# Patient Record
(demographics unavailable — no encounter records)

---

## 2024-10-28 NOTE — PHYSICAL EXAM
[General Appearance - Alert] : alert [General Appearance - In No Acute Distress] : in no acute distress [Sclera] : the sclera and conjunctiva were normal [PERRL With Normal Accommodation] : pupils were equal in size, round, and reactive to light [Extraocular Movements] : extraocular movements were intact [Outer Ear] : the ears and nose were normal in appearance [Oropharynx] : the oropharynx was normal [Neck Appearance] : the appearance of the neck was normal [Neck Cervical Mass (___cm)] : no neck mass was observed [Jugular Venous Distention Increased] : there was no jugular-venous distention [Thyroid Diffuse Enlargement] : the thyroid was not enlarged [Thyroid Nodule] : there were no palpable thyroid nodules [] : no respiratory distress [Auscultation Breath Sounds / Voice Sounds] : lungs were clear to auscultation bilaterally [Heart Rate And Rhythm] : heart rate was normal and rhythm regular [Heart Sounds] : normal S1 and S2 [Heart Sounds Gallop] : no gallops [Murmurs] : no murmurs [Heart Sounds Pericardial Friction Rub] : no pericardial rub [Full Pulse] : the pedal pulses are present [Edema] : there was no peripheral edema [Cervical Lymph Nodes Enlarged Posterior Bilaterally] : posterior cervical [Cervical Lymph Nodes Enlarged Anterior Bilaterally] : anterior cervical [Supraclavicular Lymph Nodes Enlarged Bilaterally] : supraclavicular [Axillary Lymph Nodes Enlarged Bilaterally] : axillary [No CVA Tenderness] : no ~M costovertebral angle tenderness [No Spinal Tenderness] : no spinal tenderness [Abnormal Walk] : normal gait [Nail Clubbing] : no clubbing  or cyanosis of the fingernails [Musculoskeletal - Swelling] : no joint swelling seen [Motor Tone] : muscle strength and tone were normal [FreeTextEntry1] : scattered psoriatic lesions on the feet [No Focal Deficits] : no focal deficits [Oriented To Time, Place, And Person] : oriented to person, place, and time [Impaired Insight] : insight and judgment were intact [Affect] : the affect was normal

## 2024-10-28 NOTE — HISTORY OF PRESENT ILLNESS
[Currently Experiencing] : currently [Fatigue] : fatigue [Arthralgias] : arthralgias [Myalgias] : myalgias [FreeTextEntry1] : 68-year-old male here for evaluation of elevated inflammatory markers   He has had elevated ESR, CRP and calprotectin with essentially work up thus far - normal EGD, colonoscopy, gastric emptying time, bone marrow biopsy and PET scan normal. He has also had a 60lbs weight loss over 8 months of unclear etiology with no GI symptoms or change in eating habits. Malignancy work up was negative as well. MRCP of pancreas is pending although labs were normal.   He has h/o psoriasis for 20 - 30 years over the feet and scattered on the extremities treated with topical steroids and also some back pain.  Sometimes has spasm in the fingers. C/o mid and low back pain that comes on a few times a week as does the pain in the shoulders. Denies hip or thigh pain. Pain in the back does not radiate down to the legs, stiffness < 30 mins. Pain in the shoulders is also intermittent and one side at a time, usually not in both simultaneously.  Denies any constitutional symptoms - fevers, chills, malaise, weight loss, myalgias. No h/o uveitis, hair loss, headaches, mucosal ulcers, Raynaud's, sicca symptoms, GI or  issues, serositis, pleuritis, pericarditis, weakness or paresthesia. No h/o blood clots.   [Anorexia] : no anorexia [Weight Loss] : no weight loss [Malaise] : no malaise [Fever] : no fever [Chills] : no chills [Depression] : no depression [Malar Facial Rash] : no malar facial rash [Skin Lesions] : no lesions [Skin Nodules] : no skin nodules [Oral Ulcers] : no oral ulcers [Cough] : no cough [Dry Mouth] : no dry mouth [Dysphonia] : no dysphonia [Dysphagia] : no dysphagia [Shortness of Breath] : no shortness of breath [Chest Pain] : no chest pain [Joint Swelling] : no joint swelling [Joint Warmth] : no joint warmth [Joint Deformity] : no joint deformity [Decreased ROM] : no decreased range of motion [Morning Stiffness] : no morning stiffness [Falls] : no falls [Difficulty Standing] : no difficulty standing [Difficulty Walking] : no difficulty walking [Dyspnea] : no dyspnea [Muscle Weakness] : no muscle weakness [Muscle Spasms] : no muscle spasms [Muscle Cramping] : no muscle cramping [Visual Changes] : no visual changes [Eye Pain] : no eye pain [Eye Redness] : no eye redness [Dry Eyes] : no dry eyes

## 2024-10-28 NOTE — ASSESSMENT
[FreeTextEntry1] : 68 year old male here for evaluation of elevated inflammatory markers.  1. Elevated ESR, CRP and calprotectin with a gradual 60lbs weight loss in a fairly active asymptomatic individual. He has a long standing h/o mild psoriasis and some chronic back pain with MRI cervical and thoracic spine showing DJD w/o evidence of axial SpA. Has some shoulder pain but more consistent with rotator cuff tendinopathy rather than active PMR. No GCA symptoms. Will repeat inflammatory markers and also get MRI of the LS spine. If evidence of radiographic SpA, will discuss biologic therapy. Otherwise, could consider treating for smoldering PMR given that he has had other extensive work up for malignancy and other causes of elevated sedimentation rate, which have been negative to date.   Follow up in 3 months. Review labs and MRI when available.

## 2024-10-28 NOTE — DATA REVIEWED
[FreeTextEntry1] : Cervical spine MRI (02/2024): This exam is compared with prior CT scan cervical spine performed on November 20, 2023. Loss of the normal cervical lordosis is seen. The vertebral body height alignment and marrow signal appears normal. Disc desiccation is seen involving the C3-4 C4-5 C5-6 and C6-7 levels which are secondary to chronic degenerative changes. C2-3: Normal C3-4: Disc bulge and bilateral hypertrophic facet joint changes. Moderate to severe narrowing of the right neural foramen. C4-5: Disc bulge and bilateral hypertrophic facet joint changes. Moderate narrowing of the right neural foramen and moderate narrowing of the left neural foramen C5-C6: Disc bulge and bilateral hypertrophic facet joint changes. Moderate to severe narrowing of both neural foramen C6-7: Mild disc bulge is seen. Moderate to severe narrowing of both neural foramen C7-T1: Normal T1-2: Normal The spinal cord demonstrates normal signal and caliber. Evaluation of the paraspinal soft tissues appear normal. IMPRESSION: Degenerative changes as described above.

## 2024-11-05 NOTE — PHYSICAL EXAM
[Normal] : affect appropriate [de-identified] : legs wwp, no edema [de-identified] : abdomen soft NTND, (+) BS, no splenomegaly appreciated [de-identified] : no anterior/posterior cervical, submental/submandibular, axillary, or pelvic MAURO appreciated

## 2024-11-05 NOTE — ASSESSMENT
[FreeTextEntry1] : Pt with h/o anemia and IgA MGUS. He underwent a full workup for anemia at [facility] (Dr Han) and was found to have iron deficiency and was given 3 doses of IV iron and he was further evaluated by GI for bleeding and had a neg w/u most recently with Serafin Dallas and Charmaine. He also was found to have elevated IgA levels (2x the upper limit of normal) and had a BM Bx and PET-CT.  Weight loss had been central complaint, with weight mostly in 140s over last year. He underwent extensive GI w/u for this recently, has not found a clear cause. Also seen by rheum. W/u as below  CBC results today: WBC 9.26, Hgb 11.7, Plt 311K. MCV 90.1, 1.1% imm gran, nl diff otherwise Hgb decr. from 13.5 6/2022 Fe loss was mostly likely GI despite neg w/u, ? AVMs? no recent evidence of iron deficiency, however recent BM 10/9/24 with decreased iron storage, may help explain his fatigue- will arrange IV iron repletion (does not tolerate PO iron). recheck Fe status today with Fe/TIBC, ferritin, soluble transferrin receptor cont. GI f/u re chronic c/o abd bloating CT abd 3/2023: SB diverticulosis, moderate fecal load TSH WNL 7/15/24. Has R thyroid nodule being followed by PCP.  immunoelectrophoresis cont to show weak IgA M-spike which can't be quantitated cold agglutinins as poss cause of intravascular Fe loss checked and neg total IgA increased vs 3/2023 FLC kappa and lambda and ratio relatively unchanged- repeat immunoelectrophoresis today  unexplained persistent hypoalbuminemia ? related to vegetarian diet, chronic inflammation note markedly incr ESR, incr CRP, +MARY, and fecal calprotectin urine checked 12/2023 >>> no proteinuria, nl SPEP check urine for poss source of protein loss >>> U/A (unremarkable Dec. '23), total protein (mildly elev.), urine immunoprotein studies (no monoclonal band identified, normal electrophoresis pattern) Saw rheumatology on 10/28/24- being w/u for spondyloarthropathies, PMR- continue f/u  Planning 6-7 week trip to [facility] at end of month RV after trip to [facility], sooner as needed  Pt seen and discussed with Dr. Dawson. Shilo Morales MD Hematology/Oncology Fellow, PGY-5 11/04/2024

## 2024-11-05 NOTE — ASSESSMENT
[FreeTextEntry1] : Pt with h/o anemia and IgA MGUS. He underwent a full workup for anemia at [facility] (Dr Han) and was found to have iron deficiency and was given 3 doses of IV iron and he was further evaluated by GI for bleeding and had a neg w/u most recently with Serafin Dallas and Charmaine. He also was found to have elevated IgA levels (2x the upper limit of normal) and had a BM Bx and PET-CT.  Weight loss had been central complaint, with weight mostly in 140s over last year. He underwent extensive GI w/u for this recently, has not found a clear cause. Also seen by rheum. W/u as below  CBC results today: WBC 9.26, Hgb 11.7, Plt 311K. MCV 90.1, 1.1% imm gran, nl diff otherwise Hgb decr. from 13.5 6/2022 Fe loss was mostly likely GI despite neg w/u, ? AVMs? no recent evidence of iron deficiency, however recent BM 10/9/24 with decreased iron storage, may help explain his fatigue- will arrange IV iron repletion (does not tolerate PO iron). recheck Fe status today with Fe/TIBC, ferritin, soluble transferrin receptor cont. GI f/u re chronic c/o abd bloating CT abd 3/2023: SB diverticulosis, moderate fecal load TSH WNL 7/15/24. Has R thyroid nodule being followed by PCP.  immunoelectrophoresis cont to show weak IgA M-spike which can't be quantitated cold agglutinins as poss cause of intravascular Fe loss checked and neg total IgA increased vs 3/2023 FLC kappa and lambda and ratio relatively unchanged- repeat immunoelectrophoresis today  unexplained persistent hypoalbuminemia ? related to vegetarian diet, chronic inflammation note markedly incr ESR, incr CRP, +MARY, and fecal calprotectin urine checked 12/2023 >>> no proteinuria, nl SPEP check urine for poss source of protein loss >>> U/A (unremarkable Dec. '23), total protein (mildly elev.), urine immunoprotein studies (no monoclonal band identified, normal electrophoresis pattern) Saw rheumatology on 10/28/24- being w/u for spondyloarthropathies, PMR- continue f/u  Planning 6-7 week trip to [facility] at end of month RV after trip to [facility], sooner as needed  Pt seen and discussed with Dr. Dawson. Shilo Morales MD Hematology/Oncology Fellow, PGY-5 11/04/2024

## 2024-11-05 NOTE — PHYSICAL EXAM
[Normal] : affect appropriate [de-identified] : legs wwp, no edema [de-identified] : abdomen soft NTND, (+) BS, no splenomegaly appreciated [de-identified] : no anterior/posterior cervical, submental/submandibular, axillary, or pelvic MAURO appreciated

## 2024-11-05 NOTE — HISTORY OF PRESENT ILLNESS
[Disease:__________________________] : Disease: [unfilled] [de-identified] : Mr Green is a 63 year old male presenting for workup of chronic anemia (>15 yrs) and recently discovered elevated IgA with IgA kappa band. He has a PMHx of cutaneous psoriasis (uses lotion),  HTN, CAD (s/p 2 stents, now on ASA alone) and iron deficiency anemia. In terms of the iron deficiency anemia he has been on oral iron and recently received IV iron (3x in February) through [facility]. In terms of potential sources of iron deficiency anemia, he underwent EGD and colonoscopy in 2016, again in 2019 with capsule endoscopy added on, and again in a colonoscopy and endoscopy in Feb 2020 (capsule endoscopy is pending) which all showed chronic mild gastritis and mild diverticulosis without any source of bleeding. He has never seen blood in his stool, or dark tarry stools (has darker stools with iron tablets). He also denies hematuria or urinary symptoms. His primary complaint is that he has been very weak to the point he does not like to leave the house. He denies fevers, chills, night sweats, but does have weight loss. He has lost nearly 40 lbs since September 2019 (previously was 190 lbs). He denies any appetite changes or dietary changes. He last traveled out of the country in October 2019 to [place] (for 2.5 months) and when there he felt like he was much more energetic. He has not received any DMARDs for his psoriasis.  These issues had prompted additional workup in January 2020 (Dr Jung Devine) then had to switch to Dr Ki Han in February due to insurance coverage. He had a CT scan of his chest abdomen and pelvis ordered by his GI doc (Dr Linnea Keenan) which showed duodenal and jejunal diverticulosis and several prominent right lower quadrant mesenteric lymph nodes which were also previously seen in 2015. He was found to have iron deficiency anemia (given IV iron) and also found to have an IgA kappa band / IgA MGUS and RLQ lymphadenopathy which prompted a PET-CT and bone marrow biopsy.    On 3/5/20 he had a PET-CT: impression showed subcentimeter right lower quadrant lymph nodes that are not discretely FDG- avid and too small to characterize, however several appear slightly smaller or less prominent compared to outside CT performed 2/7/20.  He also had a left hip Bone Marrow Biopsy on 3/16/20: - Variable and hypercellular for age of marrow shows maturing trilineage hematopoiesis and no histological or immunophenotypic (via IHC) evidence of clonal cell neoplasm - No evidence of acute myeloid leukemia, metastatic neoplasm or lymphoma.   Lastly, he had an MRI of the brain 2/7/20 which showed mild midline prominence of the pituitary which was performed according to Mr Green due to fatigue and weight loss. [de-identified] : Continues to have mult GI c/o; dahlia constipation and bloating has nausea once every two wks or so CRP 10, ESR 82 4 wks ago w/o definite rheumatic c/o no fevers Has been given parenteral iron in past. Source of Fe def not clear. Extensive GI w/u neg, including panendoscopy incl capsule 12/2022.  Weak IgA M-spike being followed, too small to quantitate. no evidence of plasma cell dyscrasia in 3/2020 marrow which showed moderate increase in Fe stores. Normal cytogenetics. Neg myeloma FISH panel. no new skeletal c/o.  11/4/24: not feeling well. Had CT C/A/P, endoscopy/colonoscopy/VCE, gastric emptying scan all negative done for w/u for weight loss. Returned from [facility] in April at which time he was ~158 lbs, since then he's been losing a lot of weight. BM bx was recently repeated since 2020 in early October '24 for further evaluation, also unremarkable. Also saw ID doctor, parasite test was negative. Also recently returned on 10/5 from [facility] for 15 days. Had a R inguinal hernia surgery on 10/25/24 at [facility]. Also having a lot of back pain, had a thoracic MRI and now being scheduled for lumbar MRI. Also being scheduled for MRCP tomorrow, seeing GI Dr. Oliver and Dr. Dallas. (+) productive cough with yellow mucus, taking Mucinex x1 weekly. Has constipation, taking linzess 72mcg and colace BID. Has ongoing chronic back pain. Recently stopped lisinopril bc BP improved. Not taking escitalopram. Reports he had a normal thyroid biopsy last year.

## 2024-11-05 NOTE — ASSESSMENT
[FreeTextEntry1] : Pt with h/o anemia and IgA MGUS. He underwent a full workup for anemia at [facility] (Dr Han) and was found to have iron deficiency and was given 3 doses of IV iron and he was further evaluated by GI for bleeding and had a neg w/u most recently with Serafin Dallas and Charmaine. He also was found to have elevated IgA levels (2x the upper limit of normal) and had a BM Bx and PET-CT.  Weight loss had been central complaint, with weight mostly in 140s over last year. He underwent extensive GI w/u for this recently, has not found a clear cause. Also seen by rheum. W/u as below  CBC results today: WBC 9.26, Hgb 11.7, Plt 311K. MCV 90.1, 1.1% imm gran, nl diff otherwise Hgb decr. from 13.5 6/2022 Fe loss was mostly likely GI despite neg w/u, ? AVMs? no recent evidence of iron deficiency, however recent BM 10/9/24 with decreased iron storage, may help explain his fatigue- will arrange IV iron repletion (does not tolerate PO iron). recheck Fe status today with Fe/TIBC, ferritin, soluble transferrin receptor cont. GI f/u re chronic c/o abd bloating CT abd 3/2023: SB diverticulosis, moderate fecal load TSH WNL 7/15/24. Has R thyroid nodule being followed by PCP.  immunoelectrophoresis cont to show weak IgA M-spike which can't be quantitated cold agglutinins as poss cause of intravascular Fe loss checked and neg total IgA increased vs 3/2023 FLC kappa and lambda and ratio relatively unchanged- repeat immunoelectrophoresis today  unexplained persistent hypoalbuminemia ? related to vegetarian diet, chronic inflammation note markedly incr ESR, incr CRP, +MARY, and fecal calprotectin urine checked 12/2023 >>> no proteinuria, nl SPEP check urine for poss source of protein loss >>> U/A (unremarkable Dec. '23), total protein (mildly elev.), urine immunoprotein studies (no monoclonal band identified, normal electrophoresis pattern) Saw rheumatology on 10/28/24- being w/u for spondyloarthropathies, PMR- continue f/u  Planning 6-7 week trip to [facility] at end of month RV after trip to [facility], sooner as needed  Pt seen and discussed with Dr. Dwason. Shilo Morales MD Hematology/Oncology Fellow, PGY-5 11/04/2024

## 2024-11-05 NOTE — HISTORY OF PRESENT ILLNESS
[Disease:__________________________] : Disease: [unfilled] [de-identified] : Mr Green is a 63 year old male presenting for workup of chronic anemia (>15 yrs) and recently discovered elevated IgA with IgA kappa band. He has a PMHx of cutaneous psoriasis (uses lotion),  HTN, CAD (s/p 2 stents, now on ASA alone) and iron deficiency anemia. In terms of the iron deficiency anemia he has been on oral iron and recently received IV iron (3x in February) through [facility]. In terms of potential sources of iron deficiency anemia, he underwent EGD and colonoscopy in 2016, again in 2019 with capsule endoscopy added on, and again in a colonoscopy and endoscopy in Feb 2020 (capsule endoscopy is pending) which all showed chronic mild gastritis and mild diverticulosis without any source of bleeding. He has never seen blood in his stool, or dark tarry stools (has darker stools with iron tablets). He also denies hematuria or urinary symptoms. His primary complaint is that he has been very weak to the point he does not like to leave the house. He denies fevers, chills, night sweats, but does have weight loss. He has lost nearly 40 lbs since September 2019 (previously was 190 lbs). He denies any appetite changes or dietary changes. He last traveled out of the country in October 2019 to [place] (for 2.5 months) and when there he felt like he was much more energetic. He has not received any DMARDs for his psoriasis.  These issues had prompted additional workup in January 2020 (Dr Jung Devine) then had to switch to Dr Ki Han in February due to insurance coverage. He had a CT scan of his chest abdomen and pelvis ordered by his GI doc (Dr Linnea Keenan) which showed duodenal and jejunal diverticulosis and several prominent right lower quadrant mesenteric lymph nodes which were also previously seen in 2015. He was found to have iron deficiency anemia (given IV iron) and also found to have an IgA kappa band / IgA MGUS and RLQ lymphadenopathy which prompted a PET-CT and bone marrow biopsy.    On 3/5/20 he had a PET-CT: impression showed subcentimeter right lower quadrant lymph nodes that are not discretely FDG- avid and too small to characterize, however several appear slightly smaller or less prominent compared to outside CT performed 2/7/20.  He also had a left hip Bone Marrow Biopsy on 3/16/20: - Variable and hypercellular for age of marrow shows maturing trilineage hematopoiesis and no histological or immunophenotypic (via IHC) evidence of clonal cell neoplasm - No evidence of acute myeloid leukemia, metastatic neoplasm or lymphoma.   Lastly, he had an MRI of the brain 2/7/20 which showed mild midline prominence of the pituitary which was performed according to Mr Green due to fatigue and weight loss. [de-identified] : Continues to have mult GI c/o; dahlia constipation and bloating has nausea once every two wks or so CRP 10, ESR 82 4 wks ago w/o definite rheumatic c/o no fevers Has been given parenteral iron in past. Source of Fe def not clear. Extensive GI w/u neg, including panendoscopy incl capsule 12/2022.  Weak IgA M-spike being followed, too small to quantitate. no evidence of plasma cell dyscrasia in 3/2020 marrow which showed moderate increase in Fe stores. Normal cytogenetics. Neg myeloma FISH panel. no new skeletal c/o.  11/4/24: not feeling well. Had CT C/A/P, endoscopy/colonoscopy/VCE, gastric emptying scan all negative done for w/u for weight loss. Returned from [facility] in April at which time he was ~158 lbs, since then he's been losing a lot of weight. BM bx was recently repeated since 2020 in early October '24 for further evaluation, also unremarkable. Also saw ID doctor, parasite test was negative. Also recently returned on 10/5 from [facility] for 15 days. Had a R inguinal hernia surgery on 10/25/24 at [facility]. Also having a lot of back pain, had a thoracic MRI and now being scheduled for lumbar MRI. Also being scheduled for MRCP tomorrow, seeing GI Dr. Oliver and Dr. Dallas. (+) productive cough with yellow mucus, taking Mucinex x1 weekly. Has constipation, taking linzess 72mcg and colace BID. Has ongoing chronic back pain. Recently stopped lisinopril bc BP improved. Not taking escitalopram. Reports he had a normal thyroid biopsy last year.

## 2024-11-05 NOTE — REVIEW OF SYSTEMS
[Chills] : chills [Recent Change In Weight] : ~T recent weight change [Fatigue] : fatigue [Cough] : cough [Constipation] : constipation [Diarrhea: Grade 0] : Diarrhea: Grade 0 [Joint Pain] : joint pain [Fever] : no fever [Dysphagia] : no dysphagia [Odynophagia] : no odynophagia [Chest Pain] : no chest pain [Palpitations] : no palpitations [Shortness Of Breath] : no shortness of breath [Wheezing] : no wheezing [Abdominal Pain] : no abdominal pain [Dysuria] : no dysuria [Skin Rash] : no skin rash [Dizziness] : no dizziness [Easy Bleeding] : no tendency for easy bleeding [Easy Bruising] : no tendency for easy bruising [FreeTextEntry2] : occasional night sweats recently once every 2 weeks, varies; chills chronic x2 yrs; (+) fatigue since April [de-identified] : no headaches

## 2024-11-05 NOTE — REVIEW OF SYSTEMS
[Chills] : chills [Fatigue] : fatigue [Recent Change In Weight] : ~T recent weight change [Cough] : cough [Constipation] : constipation [Diarrhea: Grade 0] : Diarrhea: Grade 0 [Joint Pain] : joint pain [Fever] : no fever [Dysphagia] : no dysphagia [Odynophagia] : no odynophagia [Chest Pain] : no chest pain [Palpitations] : no palpitations [Shortness Of Breath] : no shortness of breath [Wheezing] : no wheezing [Abdominal Pain] : no abdominal pain [Dysuria] : no dysuria [Skin Rash] : no skin rash [Dizziness] : no dizziness [Easy Bleeding] : no tendency for easy bleeding [Easy Bruising] : no tendency for easy bruising [FreeTextEntry2] : occasional night sweats recently once every 2 weeks, varies; chills chronic x2 yrs; (+) fatigue since April [de-identified] : no headaches

## 2024-11-05 NOTE — PHYSICAL EXAM
[Normal] : affect appropriate [de-identified] : legs wwp, no edema [de-identified] : abdomen soft NTND, (+) BS, no splenomegaly appreciated [de-identified] : no anterior/posterior cervical, submental/submandibular, axillary, or pelvic MAURO appreciated

## 2024-11-05 NOTE — HISTORY OF PRESENT ILLNESS
[Disease:__________________________] : Disease: [unfilled] [de-identified] : Mr Green is a 63 year old male presenting for workup of chronic anemia (>15 yrs) and recently discovered elevated IgA with IgA kappa band. He has a PMHx of cutaneous psoriasis (uses lotion),  HTN, CAD (s/p 2 stents, now on ASA alone) and iron deficiency anemia. In terms of the iron deficiency anemia he has been on oral iron and recently received IV iron (3x in February) through [facility]. In terms of potential sources of iron deficiency anemia, he underwent EGD and colonoscopy in 2016, again in 2019 with capsule endoscopy added on, and again in a colonoscopy and endoscopy in Feb 2020 (capsule endoscopy is pending) which all showed chronic mild gastritis and mild diverticulosis without any source of bleeding. He has never seen blood in his stool, or dark tarry stools (has darker stools with iron tablets). He also denies hematuria or urinary symptoms. His primary complaint is that he has been very weak to the point he does not like to leave the house. He denies fevers, chills, night sweats, but does have weight loss. He has lost nearly 40 lbs since September 2019 (previously was 190 lbs). He denies any appetite changes or dietary changes. He last traveled out of the country in October 2019 to [place] (for 2.5 months) and when there he felt like he was much more energetic. He has not received any DMARDs for his psoriasis.  These issues had prompted additional workup in January 2020 (Dr Jung Devine) then had to switch to Dr Ki Han in February due to insurance coverage. He had a CT scan of his chest abdomen and pelvis ordered by his GI doc (Dr Linnea Keenan) which showed duodenal and jejunal diverticulosis and several prominent right lower quadrant mesenteric lymph nodes which were also previously seen in 2015. He was found to have iron deficiency anemia (given IV iron) and also found to have an IgA kappa band / IgA MGUS and RLQ lymphadenopathy which prompted a PET-CT and bone marrow biopsy.    On 3/5/20 he had a PET-CT: impression showed subcentimeter right lower quadrant lymph nodes that are not discretely FDG- avid and too small to characterize, however several appear slightly smaller or less prominent compared to outside CT performed 2/7/20.  He also had a left hip Bone Marrow Biopsy on 3/16/20: - Variable and hypercellular for age of marrow shows maturing trilineage hematopoiesis and no histological or immunophenotypic (via IHC) evidence of clonal cell neoplasm - No evidence of acute myeloid leukemia, metastatic neoplasm or lymphoma.   Lastly, he had an MRI of the brain 2/7/20 which showed mild midline prominence of the pituitary which was performed according to Mr Green due to fatigue and weight loss. [de-identified] : Continues to have mult GI c/o; dahlia constipation and bloating has nausea once every two wks or so CRP 10, ESR 82 4 wks ago w/o definite rheumatic c/o no fevers Has been given parenteral iron in past. Source of Fe def not clear. Extensive GI w/u neg, including panendoscopy incl capsule 12/2022.  Weak IgA M-spike being followed, too small to quantitate. no evidence of plasma cell dyscrasia in 3/2020 marrow which showed moderate increase in Fe stores. Normal cytogenetics. Neg myeloma FISH panel. no new skeletal c/o.  11/4/24: not feeling well. Had CT C/A/P, endoscopy/colonoscopy/VCE, gastric emptying scan all negative done for w/u for weight loss. Returned from [facility] in April at which time he was ~158 lbs, since then he's been losing a lot of weight. BM bx was recently repeated since 2020 in early October '24 for further evaluation, also unremarkable. Also saw ID doctor, parasite test was negative. Also recently returned on 10/5 from [facility] for 15 days. Had a R inguinal hernia surgery on 10/25/24 at [facility]. Also having a lot of back pain, had a thoracic MRI and now being scheduled for lumbar MRI. Also being scheduled for MRCP tomorrow, seeing GI Dr. Oliver and Dr. Dallas. (+) productive cough with yellow mucus, taking Mucinex x1 weekly. Has constipation, taking linzess 72mcg and colace BID. Has ongoing chronic back pain. Recently stopped lisinopril bc BP improved. Not taking escitalopram. Reports he had a normal thyroid biopsy last year.

## 2024-11-05 NOTE — END OF VISIT
[] : Fellow [FreeTextEntry3] : Elevated inflammatory markers and GI and diet related c/o raise possibility of IBD though no supportive evidence to date. Has had very extensive and complete GI w/u.  Reasonable to give parenteral iron repletion in light of intolerance for po Fe and decr Fe stores in recent marrow. Blood Fe studies potentially misleading in setting of ongoing inflamnmation. Also suggested continued rheum f/u.

## 2024-11-05 NOTE — REVIEW OF SYSTEMS
[Chills] : chills [Recent Change In Weight] : ~T recent weight change [Fatigue] : fatigue [Cough] : cough [Constipation] : constipation [Diarrhea: Grade 0] : Diarrhea: Grade 0 [Joint Pain] : joint pain [Fever] : no fever [Dysphagia] : no dysphagia [Odynophagia] : no odynophagia [Chest Pain] : no chest pain [Palpitations] : no palpitations [Shortness Of Breath] : no shortness of breath [Wheezing] : no wheezing [Abdominal Pain] : no abdominal pain [Dysuria] : no dysuria [Skin Rash] : no skin rash [Dizziness] : no dizziness [Easy Bleeding] : no tendency for easy bleeding [Easy Bruising] : no tendency for easy bruising [FreeTextEntry2] : occasional night sweats recently once every 2 weeks, varies; chills chronic x2 yrs; (+) fatigue since April [de-identified] : no headaches

## 2024-11-05 NOTE — RESULTS/DATA
[FreeTextEntry1] : BM biopsy/aspirate (10/9/24): Positive for IgH gene rearrangement... flow cytometry with no diagnostic immunophenotypic abnormalities detected...cellular BM with maturing trilineage hematopoiesis and polytypic plasmacytosis...No morphologic/immunophenotypic evidence of involvement by lymphoma, acute leukemia, high-grade myelodysplasia, myeloproliferative neoplasm, or clonal plasma cells...Iron (aspirate): Storage iron is decreased. Ring sideroblasts are not identified...  [facility] molecular studies (Bone Marrow, 10/9/24): Normal male karyotype...negative for common myeloid  DNA mutations and RNA gene fusions

## 2024-11-06 NOTE — DATA REVIEWED
[FreeTextEntry1] : [facility] Accession Number : [id number] Patient:     NAREN SIBLEY Accession:                             [id number] Collected Date/Time:                   10/24/2024 11:40 EDT Received Date/Time:                    10/24/2024 13:18 EDT Surgical Pathology Report - Auth (Verified) Specimen(s) Submitted 1  Lipoma of cord right inguinal Final Diagnosis Lipoma of cord right inguinal: -   Mature adipose tissue consistent lipoma. Verified by: Ugo Teran MD (Electronic Signature) Reported on: 10/25/24 16:07 EDT, [facility], [address] [place] [address]

## 2024-11-06 NOTE — PHYSICAL EXAM
[Respiratory Effort] : normal respiratory effort [Normal Rate and Rhythm] : normal rate and rhythm [No HSM] : no hepatosplenomegaly [Tender] : was nontender [Enlarged] : not enlarged [Alert] : alert [Oriented to Person] : oriented to person [Oriented to Place] : oriented to place [Oriented to Time] : oriented to time [Calm] : calm [de-identified] : Appears well, no acute distress, ambulates easily into the office.  [de-identified] : Remains normocephalic and atraumatic.  [de-identified] : Still supple with full range of motion.  [FreeTextEntry1] : Deferred.  [de-identified] : Deferred.  [de-identified] : Epigastrium remains WNL. Small incidental fat containing umbilical hernia only noted during Valsalva- stable. Left groin intact with well healed operative incision. Right groin with well healing operative site: -wound clean and dry and intact -no odor or drainage -no overlying or surrounding acute inflammatory changes -moderate post operative edema and mild ecchymoses, both well WNL -no SQ collections -no fascial defect or laxity on Valsalva in upright or supine positions Adjacent femoral and Spigelian spaces intact on further dedicated Valsalva challenge. [de-identified] : Penis free of lesion.  Cords free of edema or hematoma.  Testes mildly atrophic, but free of tenderness.   [de-identified] : Deferred.  [de-identified] : Grossly symmetric and within normal limits without motor or sensory deficit.  [de-identified] : Mild psoriasis.

## 2024-11-06 NOTE — HISTORY OF PRESENT ILLNESS
[de-identified] : Exceptionally pleasant gentleman presenting to the office with son and family for hernia consultation. Mr. Green reports a distant prior LEFT inguinal hernia repair some 15 to 20 years ago without subsequent issue. He now reports being aware of a right inguinal hernia for the last 6 or 7 years.  However, it has progressed in size and by symptoms over the last 10 days or so following an upper respiratory infection. He is under the care of GI for chronic constipation but has no associated acute GI or  complaints. Mr. Green is eager to confirm his safety prior to an upcoming trip but would like to ultimately move forward with definitive care through elective repair after his upcoming bone marrow biopsy as part of surveillance for his chronic anemia. [de-identified] : Exceptionally pleasant patient returning to the office in post operative follow-up with his son. Mr. Green is now s/p ambulatory repair of his right inguinal hernia with mesh. He is pleased with his overall progress, though he does report residual incisional surgical site pain. As per his own report, he has slowly resumed his usual casual activities. As per his son's report, Mr. Green has been very hesitant to use even Tylenol and Advil/ Motrin routinely.

## 2024-11-06 NOTE — PLAN
[FreeTextEntry1] : S/P uneventful general anesthesia with uncomplicated open repair of right inguinal hernia with mesh. Mr. SIBLEY is clinically well by his history and surgically stable by this exam. No evidence by history or exam to suggest complication. Mr. SIBLEY is cleared to resume casual activities with own comfort as guide. To refrain from maximal exertions for another month. The timing and technique of scar massage reviewed preliminarily with Mr. SIBLEY. The Pathology report and its benign nature was reviewed in detail. Mr. SIBLEY has been encouraged to call with questions or concerns.  Otherwise, reminded of importance of ongoing bowel regimen with reinforcement of use of Acetaminophen TID and Advil/ Motrin at least BID until comfort improves. RTO to check on overall progress in 2 to 3 weeks prior to his upcoming trip to [facility]. Mr. SIBLEY is pleased and his son agrees. They leave in good spirits able to verbalize instructions as outlined above.

## 2024-11-06 NOTE — REVIEW OF SYSTEMS
[Feeling Poorly] : not feeling poorly [Feeling Tired] : not feeling tired [Recent Weight Loss (___ Lbs)] : recent [unfilled] ~Ulb weight loss [Cough] : cough [Constipation] : constipation [As Noted in HPI] : as noted in HPI [Anxiety] : anxiety [Depression] : no depression [Negative] : Heme/Lymph [FreeTextEntry6] : recent URI. [FreeTextEntry7] : more chronic than acute. [de-identified] : regarding this issue and visit, albeit less so.

## 2024-11-07 NOTE — HISTORY OF PRESENT ILLNESS
[FreeTextEntry1] : RPA: rash  [de-identified] : 68-year-old male with a history of palmoplantar PsO, inverse psoriasis, furunculosis, folliculitis and intertrigo. Itchy rash on the left foot for 15 years. Using vaseline and diflorasone diacetate cream.   Medical history: Fe-def anemia, IgA MGUS, CAD (s/p 2 stents, now on ASA alone), HTN, IBS-C Social history: His son physician at [employer] (hospitalist at [employer]).

## 2024-11-07 NOTE — ASSESSMENT
[FreeTextEntry1] : #Inverse PsO Chronic, improved - We have discussed treatment options, expectations from treatment, and associated side effects of topical therapies. - C/w mometasone cream apply to affected area in the groin once daily x 7 days PRN flares only.   SED including atrophy, dyspigmentation, telangiectasias, striae. Proper use reviewed including only using to affected area and avoidance of prolonged use. - C/w Vaseline as barrier cream. AVOID EtOH wipes.   #Dermatitis, L foot, psoriasis vs. LSC, chronic; flaring Reviewed risks (as well as mitigation strategies for adverse drug events as applicable), benefits, and alternatives of therapy  - continue diflorasone diacetate cream as needed for itch  - r/b/a topical steroids were discussed including but not limited to thinning of the skin, atrophy and dyspigmentation.  - encouraged to break the itch/scratch cycle  RTC PRN.

## 2024-11-07 NOTE — HISTORY OF PRESENT ILLNESS
[FreeTextEntry1] : RPA: rash  [de-identified] : 68-year-old male with a history of palmoplantar PsO, inverse psoriasis, furunculosis, folliculitis and intertrigo. Itchy rash on the left foot for 15 years. Using vaseline and diflorasone diacetate cream.   Medical history: Fe-def anemia, IgA MGUS, CAD (s/p 2 stents, now on ASA alone), HTN, IBS-C Social history: His son physician at [employer] (hospitalist at [place]).

## 2024-11-12 NOTE — ADDENDUM
[FreeTextEntry1] : Documented by Kenny Daniel acting as a scribe for Dr. George Tse on 11/12/2024. All medical record entries made by the Scribe were at my, Dr. George Tse's, direction and personally dictated by me on 11/12/2024. I have reviewed the chart and agree that the record accurately reflects my personal performance of the history, physical exam, assessment and plan. I have also personally directed, reviewed, and agree with the discharge instructions.

## 2024-11-12 NOTE — HISTORY OF PRESENT ILLNESS
[TextBox_4] : Mr. SIBLEY is a 68 year old male originally from [place] with a history of GERD, insomnia, anxiety/depression, low vitamin D, BPH, BPV, CAD s/p stent, IgA and IgG gammopathy, fatty liver, MGUS, bloat presenting to the office today for an initial pulmonary evaluation for SOB, GERD, fatigue, poor sleep, ?PEYTON. His chief complaint is  -he notes trouble getting to sleep -he notes sleeping for 2 hours -he notes waking up due to nocturia -he notes waking up feeling fatigued -he notes constipation is a significant issue -he notes drinking 7, 8 oz glasses of water per day -he notes snoring -he notes PMR Dx and pending Prednisone course  -he notes coughing significantly starting post-surgery which produces yellow mucus and originates in his chest -he notes taking Pantoprazole  -he denies any headaches, nausea, emesis, fever, chills, sweats, chest pain, chest pressure, wheezing, palpitations, diarrhea, constipation, dysphagia, vertigo, arthralgias, myalgias, leg swelling, itchy eyes, itchy ears, heartburn, reflux, or sour taste in the mouth.

## 2024-11-12 NOTE — PROCEDURE
[FreeTextEntry1] : PFTs revealed normal flows; FEV1 was 2.56 L, which is 102% of predicted; normal flow volume loop.  PFTs were performed to evaluate for SOB  FENO was 23; a normal value being less than 25 Fractional exhaled nitric oxide (FENO) is regarded as a simple, noninvasive method for assessing eosinophilic airway inflammation. Produced by a variety of cells within the lung, nitric oxide (NO) concentrations are generally low in healthy individuals. However, high concentrations of NO appear to be involved in nonspecific host defense mechanisms and chronic inflammatory diseases such as asthma. The American Thoracic Society (ATS) therefore has recommended using FENO to aid in the diagnosis and monitoring of eosinophilic airway inflammation and asthma, and for identifying steroid responsive individuals whose chronic respiratory symptoms may be caused by airway inflammation.

## 2024-11-12 NOTE — PHYSICAL EXAM
[No Acute Distress] : no acute distress [III] : Mallampati Class: III [Normal Oropharynx] : normal oropharynx [Normal Appearance] : normal appearance [No Neck Mass] : no neck mass [Normal Rate/Rhythm] : normal rate/rhythm [Normal S1, S2] : normal s1, s2 [No Murmurs] : no murmurs [No Resp Distress] : no resp distress [Clear to Auscultation Bilaterally] : clear to auscultation bilaterally [No Abnormalities] : no abnormalities [Benign] : benign [Normal Gait] : normal gait [No Clubbing] : no clubbing [No Cyanosis] : no cyanosis [No Edema] : no edema [FROM] : FROM [Normal Color/ Pigmentation] : normal color/ pigmentation [No Focal Deficits] : no focal deficits [Oriented x3] : oriented x3 [Normal Affect] : normal affect [TextBox_2] : thin  [TextBox_68] : I:E ratio 1:3; clear

## 2024-11-12 NOTE — ASSESSMENT
[FreeTextEntry1] : Mr. SIBLEY is a 68 year old male originally from [place] with a history of GERD, insomnia, anxiety/depression, low vitamin D, BPH, BPV, CAD s/p stent, IgA and IgG gammopathy, fatty liver, MGUS, bloat presenting to the office today for a follow-up pulmonary evaluation for SOB, GERD, fatigue, poor sleep, ?PEYTON; pending Rx for PMR (Prednisone)   His shortness of breath is multifactorial due to: -poor mechanics of breathing -out of shape -over weight -pulmonary disease   -RADS  -cardiac disease (Letty)   Problem 1: RADS (post-op) 10/2024 -add Breo Ellipta 200 at 1 inhalation QD -Inhaler technique reviewed as well as oral hygiene techniques reviewed with patient. Avoidance of cold air, extremes of temperature, rescue inhaler should be used before exercise. Order of medication reviewed with patient. Recommended adequate hydration and use of a cool mist humidifier in the bedroom  Problem 2: GERD -continue Pantoprazole 40 mg QAM, pre-breakfast -Rule of 2s: avoid eating too much, eating too late, eating too spicy, eating two hours before bed. -Things to avoid including overeating, spicy foods, tight clothing, eating within three hours of bed, this list is not all inclusive. -For treatment of reflux, possible options discussed including diet control, H2 blockers, PPIs, as well as coating motility agents discussed as treatment options. Timing of meals and proximity of last meal to sleep were discussed. If symptoms persist, a formal gastrointestinal evaluation is needed.  problem 2A: bloat / constipation -recommended [facility] digestive enzymes; Dr. Desai   Problem 3: ?PEYTON/poor sleep (elevated Mallampati class, poor quality sleep, snoring) (NC) -complete home sleep study -recommended [employer]  -recommended Epsom salt bath QHS -Sleep apnea is associated with adverse clinical consequences which can affect most organ systems. Cardiovascular disease risk includes arrhythmias, atrial fibrillation, hypertension, coronary artery disease, and stroke. Metabolic disorders include diabetes type 2, non-alcoholic fatty liver disease. Mood disorder especially depression; and cognitive decline especially in the elderly. Associations with chronic reflux/Barretts esophagus some but not all inclusive. -Reasons include arousal consistent with hypopnea; respiratory events most prominent in REM sleep or supine position; therefore sleep staging and body position are important for accurate diagnosis and estimation of AHI.  Problem 3A: PMR -complete follow-up with Dr. Walden -add Prednisone 5 mg / day (11/2024) -Information sheet given to the patient to be reviewed, this medication is never to be used without consulting the prescribing physician. Proper dietary restraint is necessary specifically salt containing foods, if any reaction may occur should be reported.   problem 4: cardiac disease -recommended to continue to follow up with Cardiologist (Letty)   problem 5: poor breathing mechanics -Proper breathing techniques were reviewed with an emphasis of exhalation. Patient instructed to breathe in for 1 second and out for 4 seconds. Patient was encouraged to not talk while walking.   problem 6: out of shape -exercise, and diet control were discussed and are highly encouraged. Treatment options are given such as, aqua therapy, and contacting a nutritionist. Recommended to use the elliptical, stationary bike, less use of treadmill.   problem 7: health maintenance -recommended yearly flu shot after October 15, 2023 -recommended strep pneumonia vaccines: Prevnar-20 vaccine, followed by Pneumo vaccine 23 one year following after 65 years old. -recommended early intervention for Upper Respiratory Infections (URIs) -recommended regular osteoporosis evaluations -recommended early dermatological evaluations -recommended after the age of 50 to the age of 70, colonoscopy every 5 years   F/P in 6-8 weeks. He is encouraged to call with any changes, concerns, or questions

## 2024-11-18 NOTE — HISTORY OF PRESENT ILLNESS
[FreeTextEntry1] : establish care [de-identified] : 67 yo M pmh CAD s/p stent, BPH, depression, HTN, IgA and IgG monoclonal gammopathy , MGUS, fatty liver, PMR presents to establish care . Patient had right inguinal hernia surgery October 24, 2024. He continues to have pain, which he says worsens with the cough that he has had for the past few weeks. He has follow up tomorrow with Dr Hamilton, surgery. Seeing rheum for PMR, was prescribed prednisone however will be starting after trip to [facility] in 2 months.  Completing immunotherapy prior  to trip to [facility] next week. He will be there for 1.5 months.  MGUS stable-- per son, had bone marrow biopsy October 2024 which was negative.  Hx of iron deficiency anemia Was prescribed lexapro by neuro, however patient has not started. Lives at home with wife, son, daughter in law and 2 yo granddaughter.  Retired [employer].  Denies tobacco use, etoh use or illicit drug use.   Rheum Dr Walden  Cardio Dr Johnson Gastro Dr Dallas  Heme Dr Dawson and [place] heme Neuro Dr Les Amanda - was seen due to memory loss and gait imbalance. MR head was negative.

## 2024-11-18 NOTE — PLAN
[FreeTextEntry1] : CAD s/p 2 stents  - continue aspirin 81 mg QD, rosuvastatin 5 mgQD  - cardio Dr Johnson -- last appointment 2 weeks prior. Had nuclear stress testing which was unremarkable  HTN - Blood pressure at goal  - continue metoprolol succ ER 25 mg QD - advised to log BP at home.  - advised on dietary changes, increasing exercise, avoiding excess salt   Depression - symptoms stable - was prescribed escitalopram 10 mg QD , however has not taken

## 2024-11-18 NOTE — HISTORY OF PRESENT ILLNESS
[FreeTextEntry1] : establish care [de-identified] : 67 yo M pmh CAD s/p stent, BPH, depression, HTN, IgA and IgG monoclonal gammopathy , MGUS, fatty liver, PMR presents to establish care . Patient had right inguinal hernia surgery October 24, 2024. He continues to have pain, which he says worsens with the cough that he has had for the past few weeks. He has follow up tomorrow with Dr Hamilton, surgery. Seeing rheum for PMR, was prescribed prednisone however will be starting after trip to [facility] in 2 months.  Completing immunotherapy prior  to trip to [facility] next week. He will be there for 1.5 months.  MGUS stable-- per son, had bone marrow biopsy October 2024 which was negative.  Hx of iron deficiency anemia Was prescribed lexapro by neuro, however patient has not started. Lives at home with wife, son, daughter in law and 2 yo granddaughter.  Retired [employer].  Denies tobacco use, etoh use or illicit drug use.   Rheum Dr Walden  Cardio Dr Johnson Gastro Dr Dallas  Heme Dr Dawson and [place] heme Neuro Dr Les Amanda - was seen due to memory loss and gait imbalance. MR head was negative.

## 2024-11-18 NOTE — HISTORY OF PRESENT ILLNESS
[FreeTextEntry1] : establish care [de-identified] : 69 yo M pmh CAD s/p stent, BPH, depression, HTN, IgA and IgG monoclonal gammopathy , MGUS, fatty liver, PMR presents to establish care . Patient had right inguinal hernia surgery October 24, 2024. He continues to have pain, which he says worsens with the cough that he has had for the past few weeks. He has follow up tomorrow with Dr Hamilton, surgery. Seeing rheum for PMR, was prescribed prednisone however will be starting after trip to [facility] in 2 months.  Completing immunotherapy prior  to trip to [facility] next week. He will be there for 1.5 months.  MGUS stable-- per son, had bone marrow biopsy October 2024 which was negative.  Hx of iron deficiency anemia Was prescribed lexapro by neuro, however patient has not started. Lives at home with wife, son, daughter in law and 2 yo granddaughter.  Retired [employer].  Denies tobacco use, etoh use or illicit drug use.   Rheum Dr Walden  Cardio Dr Johnson Gastro Dr Dallas  Heme Dr Dawson and [place] heme Neuro Dr Les Amanda - was seen due to memory loss and gait imbalance. MR head was negative.

## 2024-11-19 NOTE — REASON FOR VISIT
[Follow-up] : a follow-up of an existing diagnosis [FreeTextEntry1] : Routine follow up for GI issues

## 2024-11-19 NOTE — CONSULT LETTER
[Dear  ___] : Dear  [unfilled], [Courtesy Letter:] : I had the pleasure of seeing your patient, [unfilled], in my office today. [Please see my note below.] : Please see my note below. [Consult Closing:] : Thank you very much for allowing me to participate in the care of this patient.  If you have any questions, please do not hesitate to contact me. [Sincerely,] : Sincerely, [FreeTextEntry3] : Jp Dallas M.D.

## 2024-11-19 NOTE — ASSESSMENT
[FreeTextEntry1] : 1.  Persistently elevated fecal calprotectin, with hyperglobulinemia/hypoalbuminemia, mild anemia, progressive weight loss; no obvious cause noted at EGD and colonoscopy June 2024; duodenal diverticulum, possible edematous and villous appearing small bowel more distally at capsule endoscopy August 2024, with subsequent CT enterography essentially unremarkable--may have low-grade inflammation in distal small bowel causing the elevated calprotectin.  Pancreatic insufficiency unlikely, given tendency towards constipation, etc.It is conceivable that the steroids may also decrease the protein loss.    2.  History of GERD and Helicobacter pylori gastritis; duodenal diverticulum, small hiatal hernia, chronic gastritis, minimal increased duodenal bulb intraepithelial lymphocytes at repeat EGD June 2024.  Reflux symptoms well-controlled with acid suppressive therapy. 3.  Left-sided diverticulosis, hemorrhoids but no significant ileocolonic inflammation at repeat colonoscopy June 2024. 4.  Status post 2 CAD stents 2013, maintained on aspirin. 5.  Hypertension. 6.  BPH. 7.  Mild anemia, biclonal gammopathy.  8.  Pituitary tumor, reportedly stable. 9.  Right thyroid nodule, benign. 10.  Polymyalgia rheumatica, to start steroids shortly. 11.  Status post bilateral inguinal herniorrhaphies.   12.  Allergic to penicillin.  Plan: 1.  Interim medical records reviewed. 2.  Would continue PPI upon awakening for now, given upcoming trip to [place] and starting steroids. 3.  Continue current bowel regimen. 4.  He will try to retrieve MRCP results (and bone marrow study) for review. 5.  He is aware that I will be retiring in early 2025, so he will need to be followed by other GI, going forward.

## 2024-11-19 NOTE — PHYSICAL EXAM
[de-identified] : RIH scar healing/well-apposed without discharge; mild tenderness over medial aspect

## 2024-11-19 NOTE — HISTORY OF PRESENT ILLNESS
[FreeTextEntry1] : Daniel underwent right inguinal herniorrhaphy 10/24/2024.  He underwent repeat bone marrow biopsy (Dr. Borrero), reportedly +FISH but "basically stable vs. 2020."  He had seen Dr. Oliver for another GI opinion, with MRCP done (result not available--done at [facility]).  He was diagnosed with PMR, advised to start Prednisone, but he we will hold off until he returns from six weeks in [facility].  He stopped famotidine PM dose, now just taking pantoprazole in the mornings, with good control reflux; he wonders whether he should stop that, as well.  Bowels have been well-controlled with Linzess and MiraLAX.  Because of persistently elevated calprotectin, he underwent repeat capsule endoscopy here August 2024, revealing transient hold-up of the capsule likely secondary to duodenal diverticulum (or perhaps extrinsic compression) but the capsule did not reach the colon by the end of the 8-hour study.  Subsequent CT enterography was unremarkable, other than duodenal diverticulum and diffuse normal-appearing mucosal enhancement of the small bowel and colon in the arterial phase.

## 2024-11-26 NOTE — DATA REVIEWED
[FreeTextEntry1] : EXAM: [id number] - MR PELVIS BONY ONLY  - ORDERED BY: ISABEL MCKINNEY   PROCEDURE DATE:  11/14/2024    INTERPRETATION:  CLINICAL INFORMATION: Back pain. History of elevated inflammatory markers and psoriatic arthritis.  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 Recent right inguinal hernia surgery (10/24/2024).  COMPARISON: CT abdomen and pelvis 8/22/2024.  CONTRAST: IV Contrast: NONE  TECHNIQUE: MRI of BONY PELVIS was performed.  FINDINGS:  LOCALIZER: No additional findings.  OSSEOUS STRUCTURES: No fracture.  RIGHT HIP JOINT: Degenerative signal within the anterior superior labrum. Articular cartilage is grossly preserved. No effusion.  LEFT HIP JOINT: Degenerative signal within the anterior superior labrum. Articular cartilage is grossly preserved. No effusion.  SACROILIAC JOINTS: Mild subchondral cystic changes along the left sacroiliac joint, most prominent along the anterior inferior aspect of the ilium and better seen on recent CT abdomen and pelvis. Periarticular fat deposition and parallel subchondral sclerosis is also present. Minimal subchondral cystic change along the right sacroiliac joint. No associated marrow edema.  PUBIC SYMPHYSIS: Moderate degenerative changes of the pubic symphysis.  LOWER LUMBAR SPINE: Lower lumbar spine is intact.  TENDONS AND MUSCLES: Mild feathery edema within the left gluteus sierra muscle and bilateral dorsal paraspinal muscles. Tendons are intact  NERVES: Visualized nerves are preserved.  INTRAPELVIC STRUCTURES: Trace free fluid in the pelvis. Intravesicular prostatic protrusion.  SUBCUTANEOUS TISSUES: Edema within the right inguinal region tracking superiorly along the anterior right abdominal wall with associated ill-defined soft tissue.  IMPRESSION: 1.  No active sacroiliitis. 2.  Findings suggestive of chronic sequela of left greater than right sacroiliac inflammatory change. 3.  Edema within the right inguinal region tracking superiorly along the anterior abdominal wall and trace free fluid in the pelvis, likely reflecting postoperative change. 4.  Mild left gluteus sierra and bilateral dorsal paraspinal muscle strain.  --- End of Report ---

## 2024-11-26 NOTE — REVIEW OF SYSTEMS
[Feeling Poorly] : not feeling poorly [Feeling Tired] : not feeling tired [Recent Weight Loss (___ Lbs)] : recent [unfilled] ~Ulb weight loss [Cough] : cough [Constipation] : constipation [As Noted in HPI] : as noted in HPI [Anxiety] : anxiety [Depression] : no depression [Negative] : Heme/Lymph [FreeTextEntry6] : recent URI. [FreeTextEntry7] : more chronic than acute. [de-identified] : regarding this issue and visit, albeit much less so.

## 2024-11-26 NOTE — HISTORY OF PRESENT ILLNESS
[de-identified] : Exceptionally pleasant gentleman presenting to the office with son and family for hernia consultation. Mr. Green reports a distant prior LEFT inguinal hernia repair some 15 to 20 years ago without subsequent issue. He now reports being aware of a right inguinal hernia for the last 6 or 7 years.  However, it has progressed in size and by symptoms over the last 10 days or so following an upper respiratory infection. He is under the care of GI for chronic constipation but has no associated acute GI or  complaints. Mr. Green is eager to confirm his safety prior to an upcoming trip but would like to ultimately move forward with definitive care through elective repair after his upcoming bone marrow biopsy as part of surveillance for his chronic anemia. [de-identified] : Exceptionally pleasant though admittedly anxious patient returning to the office with his wife for ongoing post operative care. Mr. Green is s/p ambulatory repair of a right inguinal hernia with mesh. He is pleased with his overall progress, though while he continues his usual activities with minimal if any discomfort at rest, he complains of discomfort in groin with cough and BM's. Mr. Green is free of any associated ongoing new GI or  complaints. A MRI of the pelvis was done for a separate indication since last seen. He draws my attention to the incision "is it okay?"

## 2024-11-26 NOTE — PLAN
[FreeTextEntry1] : S/P uneventful anesthesia for uncomplicated open repair of a right inguinal hernia with mesh. Mr. SIBLEY is clinically well overall by his history and surgically stable by serial exams. No evidence by history or exam to suggest complication; reassurance provided that small/ benign appearing simple superficial skin separation c/w presumed extruded surgical knot given location and appearance. Will observe and treat with H202 rinse after bath and shower. Mr. SIBLEY is cleared to resume all activities with own comfort as guide. Given his travel to [facility], he is very concerned regarding any discomfort which may impact activity; as such, -Voltaren BID for local discomfort -if insufficient, then Naproxen ER prescribed -he is aware that they are not to be taken together, and that Naproxen should be taken with food  Will hold off on discussion of scar massage until his return from travel. Mr. SIBLEY has been encouraged to call with questions or concerns.  Otherwise, reminded of importance of ongoing bowel regimen. RTO to check on overall progress following his upcoming trip to [facility]. Mr. SIBLEY is pleased and his wife agrees. They leave in good spirits able to verbalize instructions as outlined above.  Discussed by phone with son following office visit.

## 2025-01-30 NOTE — PHYSICAL EXAM
[No Acute Distress] : no acute distress [Well-Appearing] : well-appearing [No Respiratory Distress] : no respiratory distress  [No Accessory Muscle Use] : no accessory muscle use [Clear to Auscultation] : lungs were clear to auscultation bilaterally [Normal Rate] : normal rate  [Regular Rhythm] : with a regular rhythm [Normal S1, S2] : normal S1 and S2 [Speech Grossly Normal] : speech grossly normal [Alert and Oriented x3] : oriented to person, place, and time

## 2025-01-31 NOTE — HISTORY OF PRESENT ILLNESS
[FreeTextEntry1] : follow up [de-identified] : 67 yo M pmh CAD s/p stent, BPH, depression, HTN, IgA and IgG monoclonal gammopathy , MGUS, fatty liver, PMR presents for follow up Recently returned from 7 week trip from [place]. Developed cellulitis of left foot. Seen by ID and treated with antibiotics. Due to swelling, he also had doppler LE performed which was negative. Had bloodwork performed with with hematology, who he follows for MGUS.  Seeing rheum for PMR, has  been prescribed prednisone which he has not started yet. MGUS stable-- had bone marrow biopsy October 2024 which was negative. Hx of iron deficiency anemia -- received IV venofer Was prescribed lexapro by neuro for anxiety, however patient has not started.  Lives at home with wife, son, daughter in law and 2 yo granddaughter --> will be moving to [place]. Denies tobacco use, etoh use or illicit drug use.  Rheum Dr [place] Cardio Dr Johnson Gastro Dr [place] Heme Dr Dawson and [facility] heme Neuro Dr Les Amanda - was seen due to memory loss and gait imbalance. MR head was negative.

## 2025-01-31 NOTE — HISTORY OF PRESENT ILLNESS
[FreeTextEntry1] : follow up [de-identified] : 69 yo M pmh CAD s/p stent, BPH, depression, HTN, IgA and IgG monoclonal gammopathy , MGUS, fatty liver, PMR presents for follow up Recently returned from 7 week trip from [place]. Developed cellulitis of left foot. Seen by ID and treated with antibiotics. Due to swelling, he also had doppler LE performed which was negative. Had bloodwork performed with with hematology, who he follows for MGUS.  Seeing rheum for PMR, has  been prescribed prednisone which he has not started yet. MGUS stable-- had bone marrow biopsy October 2024 which was negative. Hx of iron deficiency anemia -- received IV venofer Was prescribed lexapro by neuro for anxiety, however patient has not started.  Lives at home with wife, son, daughter in law and 2 yo granddaughter --> will be moving to [place]. Denies tobacco use, etoh use or illicit drug use.  Rheum Dr [facility] Cardio Dr Johnson Gastro Dr [place] Heme Dr Dawson and [facility] heme Neuro Dr Les Amanda - was seen due to memory loss and gait imbalance. MR head was negative.

## 2025-01-31 NOTE — PLAN
[FreeTextEntry1] : CAD s/p 2 stents - will repeat lipid panel at next visit . CMP stable Jan 2025 - continue aspirin 81 mg QD, rosuvastatin 5 mgQD - cardio Dr Johnson  Iron deficiency anemia - recently had bloodwork performed Jan 2025, hemoglobin 12.1, WBC 10.4 --> will have records sent. patient showed results on phone salome - following with Dr Dawson.   HTN - Blood pressure at goal - continue metoprolol succ ER 25 mg QD - advised to log BP at home. - advised on dietary changes, increasing exercise, avoiding excess salt  Depression, anxiety - symptoms stable - was prescribed escitalopram 10 mg QD , however has not taken. Patient will consider start

## 2025-01-31 NOTE — REVIEW OF SYSTEMS
[Joint Pain] : joint pain [Joint Stiffness] : joint stiffness [Muscle Pain] : muscle pain [Joint Swelling] : joint swelling [Negative] : Neurological [Muscle Weakness] : no muscle weakness

## 2025-02-03 NOTE — PHYSICAL EXAM
[General Appearance - Alert] : alert [General Appearance - In No Acute Distress] : in no acute distress [Sclera] : the sclera and conjunctiva were normal [PERRL With Normal Accommodation] : pupils were equal in size, round, and reactive to light [Extraocular Movements] : extraocular movements were intact [Outer Ear] : the ears and nose were normal in appearance [Oropharynx] : the oropharynx was normal [Neck Appearance] : the appearance of the neck was normal [Neck Cervical Mass (___cm)] : no neck mass was observed [Jugular Venous Distention Increased] : there was no jugular-venous distention [Thyroid Diffuse Enlargement] : the thyroid was not enlarged [Thyroid Nodule] : there were no palpable thyroid nodules [] : no respiratory distress [Auscultation Breath Sounds / Voice Sounds] : lungs were clear to auscultation bilaterally [Heart Rate And Rhythm] : heart rate was normal and rhythm regular [Heart Sounds] : normal S1 and S2 [Heart Sounds Gallop] : no gallops [Murmurs] : no murmurs [Heart Sounds Pericardial Friction Rub] : no pericardial rub [Full Pulse] : the pedal pulses are present [Edema] : there was no peripheral edema [Cervical Lymph Nodes Enlarged Posterior Bilaterally] : posterior cervical [Cervical Lymph Nodes Enlarged Anterior Bilaterally] : anterior cervical [Supraclavicular Lymph Nodes Enlarged Bilaterally] : supraclavicular [Axillary Lymph Nodes Enlarged Bilaterally] : axillary [No CVA Tenderness] : no ~M costovertebral angle tenderness [No Spinal Tenderness] : no spinal tenderness [Abnormal Walk] : normal gait [Nail Clubbing] : no clubbing  or cyanosis of the fingernails [Musculoskeletal - Swelling] : no joint swelling seen [Motor Tone] : muscle strength and tone were normal [No Focal Deficits] : no focal deficits [Oriented To Time, Place, And Person] : oriented to person, place, and time [Impaired Insight] : insight and judgment were intact [Affect] : the affect was normal [FreeTextEntry1] : scattered psoriatic lesions on the feet

## 2025-02-03 NOTE — HISTORY OF PRESENT ILLNESS
[___ Month(s) Ago] : [unfilled] month(s) ago [Currently Experiencing] : currently [Fatigue] : fatigue [Arthralgias] : arthralgias [Myalgias] : myalgias [FreeTextEntry1] : No active PMR symptoms. Did not start Prednisone yet partly due to concern for side effects and also because of travel and recent LE cellulitis. Finished antibiotics 2 days ago with resolution of the infection. MGUS is stable. Occasional ramping in the hands and low back pain but otherwise denies any specific joint pains  [Anorexia] : no anorexia [Weight Loss] : no weight loss [Malaise] : no malaise [Fever] : no fever [Chills] : no chills [Depression] : no depression [Malar Facial Rash] : no malar facial rash [Skin Lesions] : no lesions [Skin Nodules] : no skin nodules [Oral Ulcers] : no oral ulcers [Cough] : no cough [Dry Mouth] : no dry mouth [Dysphonia] : no dysphonia [Dysphagia] : no dysphagia [Shortness of Breath] : no shortness of breath [Chest Pain] : no chest pain [Joint Swelling] : no joint swelling [Joint Warmth] : no joint warmth [Joint Deformity] : no joint deformity [Decreased ROM] : no decreased range of motion [Morning Stiffness] : no morning stiffness [Falls] : no falls [Difficulty Standing] : no difficulty standing [Difficulty Walking] : no difficulty walking [Dyspnea] : no dyspnea [Muscle Weakness] : no muscle weakness [Muscle Spasms] : no muscle spasms [Muscle Cramping] : no muscle cramping [Visual Changes] : no visual changes [Eye Pain] : no eye pain [Eye Redness] : no eye redness [Dry Eyes] : no dry eyes

## 2025-02-03 NOTE — ASSESSMENT
[FreeTextEntry1] : 68 year old male here for evaluation of elevated inflammatory markers.  1. Elevated ESR, CRP and calprotectin with a gradual 60lbs weight loss in a fairly active asymptomatic individual. He has a long standing h/o mild psoriasis and some chronic back pain with MRI cervical and thoracic spine showing DJD w/o evidence of axial SpA. Has some shoulder pain but more consistent with rotator cuff tendinopathy rather than active PMR. No GCA symptoms. No evidence of radiographic SpA, Will repeat inflammatory markers and decide if we need to treat with low dose Prednisone.   2. Psoriasis: mild, limited to his L foot. Advised topical therapy.   3. DEXA ordered to screen for osteoporosis   Follow up in 3 months.

## 2025-02-25 NOTE — REVIEW OF SYSTEMS
[Feeling Poorly] : not feeling poorly [Feeling Tired] : not feeling tired [Constipation] : constipation [As Noted in HPI] : as noted in HPI [Anxiety] : no anxiety [Depression] : no depression [Negative] : Heme/Lymph [FreeTextEntry7] : chronic.

## 2025-02-25 NOTE — PLAN
[FreeTextEntry1] : S/P uncomplicated open repair of a right inguinal hernia with mesh. Mr. SIBLEY is clinically well overall by his going history and surgically stable by serial examination. No evidence by history or exam at present to suggest complication. Reassurance provided that recent event and area of concern c/w extruded surgical knot most likely. Will observe and continue daily warm water and soap wash and may stop H2O2 as intiated. He is cleared to continue all activities with own comfort as guide. Mr. SIBLEY and his son have been encouraged to call with any questions/ concerns/ changes.  RTO is now planned as PRN, though I remain available.  Mr. SIBLEY is pleased and his son agrees. Please note that more than 50% of face-to-face time was spent in counseling and coordination of care.

## 2025-02-25 NOTE — PHYSICAL EXAM
[Respiratory Effort] : normal respiratory effort [Normal Rate and Rhythm] : normal rate and rhythm [No HSM] : no hepatosplenomegaly [Tender] : was nontender [Enlarged] : not enlarged [Alert] : alert [Oriented to Person] : oriented to person [Oriented to Place] : oriented to place [Oriented to Time] : oriented to time [Calm] : calm [de-identified] : Appears well, no acute distress, ambulates easily into the office.  [de-identified] : Remains normocephalic and atraumatic, per his baseline.  [de-identified] : Still supple with full range of motion, per his usual. [FreeTextEntry1] : Deferred.  [de-identified] : Deferred.  [de-identified] : Epigastrium remains WNL- all stable. Small incidental fat containing umbilical hernia only noted during Valsalva- all stable. Left groin intact with well healed operative incision- all stable. Right groin with well healing operative site: -wound clean, dry and intact  -no expressible drainage or appreciable odor -no overlying/ surrounding inflammatory changes -mild more chronic than acute darkening of skin and subtle scar hypertrophy within area of concern/ hair line -fully resolved edema and ecchymoses -no SQ collections -no fascial defect/ laxity on Valsalva in upright/ supine position- all stable Adjacent femoral and Spigelian space intact on further dedicated Valsalva challenges. [de-identified] : Penis free of lesion.  Cords free of any edema or hematoma.  Testes mildly atrophic, but free of tenderness- all stable.   [de-identified] : Deferred.  [de-identified] : Grossly symmetric and within normal limits without motor or sensory deficit.  [de-identified] : Mild psoriasis.

## 2025-02-25 NOTE — DATA REVIEWED
[FreeTextEntry1] : EXAM: [id number] - MR PELVIS BONY ONLY  - ORDERED BY: ISABEL MCKINNEY PROCEDURE DATE:  11/14/2024 INTERPRETATION:  CLINICAL INFORMATION: Back pain. History of elevated inflammatory markers and psoriatic arthritis.  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 Recent right inguinal hernia surgery (10/24/2024). COMPARISON: CT abdomen and pelvis 8/22/2024. CONTRAST: IV Contrast: NONE TECHNIQUE: MRI of BONY PELVIS was performed. FINDINGS: LOCALIZER: No additional findings. OSSEOUS STRUCTURES: No fracture. RIGHT HIP JOINT: Degenerative signal within the anterior superior labrum. Articular cartilage is grossly preserved. No effusion. LEFT HIP JOINT: Degenerative signal within the anterior superior labrum. Articular cartilage is grossly preserved. No effusion. SACROILIAC JOINTS: Mild subchondral cystic changes along the left sacroiliac joint, most prominent along the anterior inferior aspect of the ilium and better seen on recent CT abdomen and pelvis. Periarticular fat deposition and parallel subchondral sclerosis is also present. Minimal subchondral cystic change along the right sacroiliac joint. No associated marrow edema. PUBIC SYMPHYSIS: Moderate degenerative changes of the pubic symphysis. LOWER LUMBAR SPINE: Lower lumbar spine is intact. TENDONS AND MUSCLES: Mild feathery edema within the left gluteus sierra muscle and bilateral dorsal paraspinal muscles. Tendons are intact NERVES: Visualized nerves are preserved. INTRAPELVIC STRUCTURES: Trace free fluid in the pelvis. Intravesicular prostatic protrusion. SUBCUTANEOUS TISSUES: Edema within the right inguinal region tracking superiorly along the anterior right abdominal wall with associated ill-defined soft tissue. IMPRESSION: 1.  No active sacroiliitis. 2.  Findings suggestive of chronic sequela of left greater than right sacroiliac inflammatory change. 3.  Edema within the right inguinal region tracking superiorly along the anterior abdominal wall and trace free fluid in the pelvis, likely reflecting postoperative change. 4.  Mild left gluteus sierra and bilateral dorsal paraspinal muscle strain. --- End of Report ---

## 2025-02-25 NOTE — HISTORY OF PRESENT ILLNESS
[de-identified] : Exceptionally pleasant gentleman presenting to the office with son and family for hernia consultation. Mr. Green reports a distant prior LEFT inguinal hernia repair some 15 to 20 years ago without subsequent issue. He now reports being aware of a right inguinal hernia for the last 6 or 7 years.  However, it has progressed in size and by symptoms over the last 10 days or so following an upper respiratory infection. He is under the care of GI for chronic constipation but has no associated acute GI or  complaints. Mr. Green is eager to confirm his safety prior to an upcoming trip but would like to ultimately move forward with definitive care through elective repair after his upcoming bone marrow biopsy as part of surveillance for his chronic anemia. [de-identified] : Exceptionally pleasant though admittedly anxious patient returning to office with his son following open repair of his right inguinal hernia with mesh in October for ongoing post operative care following a recent bout of small fibrino-serous drainage from the medial/ inferior aspect of the incision.  Fortunately, this has resolved and he reports feeling well. He is pleased with his recovery and continues his usual activities including extensive travel.  Mr. Green is free of any associated or ongoing new GI or  complaints.

## 2025-03-05 NOTE — HISTORY OF PRESENT ILLNESS
[Disease:__________________________] : Disease: [unfilled] [de-identified] : Mr Green is a 63 year old male presenting for workup of chronic anemia (>15 yrs) and recently discovered elevated IgA with IgA kappa band. He has a PMHx of cutaneous psoriasis (uses lotion),  HTN, CAD (s/p 2 stents, now on ASA alone) and iron deficiency anemia. In terms of the iron deficiency anemia he has been on oral iron and recently received IV iron (3x in February) through [facility]. In terms of potential sources of iron deficiency anemia, he underwent EGD and colonoscopy in 2016, again in 2019 with capsule endoscopy added on, and again in a colonoscopy and endoscopy in Feb 2020 (capsule endoscopy is pending) which all showed chronic mild gastritis and mild diverticulosis without any source of bleeding. He has never seen blood in his stool, or dark tarry stools (has darker stools with iron tablets). He also denies hematuria or urinary symptoms. His primary complaint is that he has been very weak to the point he does not like to leave the house. He denies fevers, chills, night sweats, but does have weight loss. He has lost nearly 40 lbs since September 2019 (previously was 190 lbs). He denies any appetite changes or dietary changes. He last traveled out of the country in October 2019 to [place] (for 2.5 months) and when there he felt like he was much more energetic. He has not received any DMARDs for his psoriasis.  These issues had prompted additional workup in January 2020 (Dr Jung Devine) then had to switch to Dr Ki Hna in February due to insurance coverage. He had a CT scan of his chest abdomen and pelvis ordered by his GI doc (Dr Linnea Keenan) which showed duodenal and jejunal diverticulosis and several prominent right lower quadrant mesenteric lymph nodes which were also previously seen in 2015. He was found to have iron deficiency anemia (given IV iron) and also found to have an IgA kappa band / IgA MGUS and RLQ lymphadenopathy which prompted a PET-CT and bone marrow biopsy.    On 3/5/20 he had a PET-CT: impression showed subcentimeter right lower quadrant lymph nodes that are not discretely FDG- avid and too small to characterize, however several appear slightly smaller or less prominent compared to outside CT performed 2/7/20.  He also had a left hip Bone Marrow Biopsy on 3/16/20: - Variable and hypercellular for age of marrow shows maturing trilineage hematopoiesis and no histological or immunophenotypic (via IHC) evidence of clonal cell neoplasm - No evidence of acute myeloid leukemia, metastatic neoplasm or lymphoma.   Lastly, he had an MRI of the brain 2/7/20 which showed mild midline prominence of the pituitary which was performed according to Mr Green due to fatigue and weight loss. [de-identified] : sl decrease in GI c/o; dahlia constipation and bloating   CRP 10, ESR 82 4 wks ago w/o definite rheumatic c/o 11/2024, more recent ESR 52 no fevers saw Dr. Walden, being watched and not given steroids at this timer last BM showed decr Fe stores Had been given parenteral iron in past. Source of Fe def not clear. Extensive GI w/u neg, including panendoscopy incl capsule 12/2022.  Weak IgA M-spike being followed, too small to quantitate.

## 2025-03-05 NOTE — REVIEW OF SYSTEMS
[Chills] : chills [Fatigue] : fatigue [Recent Change In Weight] : ~T recent weight change [Cough] : cough [Constipation] : constipation [Diarrhea: Grade 0] : Diarrhea: Grade 0 [Joint Pain] : joint pain [FreeTextEntry2] : occasional night sweats recently once every 2 weeks, varies; chills chronic x2 yrs; (+) fatigue since April [Fever] : no fever [Dysphagia] : no dysphagia [Odynophagia] : no odynophagia [Chest Pain] : no chest pain [Palpitations] : no palpitations [Shortness Of Breath] : no shortness of breath [Wheezing] : no wheezing [Abdominal Pain] : no abdominal pain [Dysuria] : no dysuria [Skin Rash] : no skin rash [Dizziness] : no dizziness [Easy Bleeding] : no tendency for easy bleeding [Easy Bruising] : no tendency for easy bruising [de-identified] : no headaches

## 2025-03-05 NOTE — ASSESSMENT
[FreeTextEntry1] : Pt with h/o anemia and IgA MGUS. He underwent a full workup for anemia at [facility] (Dr Han) and was found to have iron deficiency and was given 3 doses of IV iron and he was further evaluated by GI for bleeding and had a neg w/u most recently with Serafin Dallas and Charmaine. He also was found to have elevated IgA levels (2x the upper limit of normal) and had a BM Bx and PET-CT.  Weight loss had been central complaint, with weight mostly in 140s over last year. He underwent extensive GI w/u for this recently, has not found a clear cause. Also seen by rheum.   Received two addl doses parenteral Fe here 11/2024- Fe studies not c/w def but iron stores decreased in 10/2024 marrow; RDW increased and now wnl post iv Fe  CBC results today: WBC 8.59, Hgb 12.3, Plt 276K. MCV 93.4, nl diff   Hgb still lower vs . 6/2022 when it was 13.5 Fe loss  ? GI despite neg w/u, ? AVMs?   immunoelectrophoresis cont to show weak IgA M-spike which can't be quantitated cold agglutinins as poss cause of intravascular Fe loss checked and neg total IgA increased vs 3/2023 FLC kappa and lambda and ratio relatively unchanged- repeat immunoelectrophoresis today  check CMP, LDH, immunoelectrophoresis, ferritin RV 6 mos or prn

## 2025-03-05 NOTE — ASSESSMENT
[FreeTextEntry1] : Pt with h/o anemia and IgA MGUS. He underwent a full workup for anemia at [facility] (Dr Han) and was found to have iron deficiency and was given 3 doses of IV iron and he was further evaluated by GI for bleeding and had a neg w/u most recently with Serafin Dallas and Charmaine. He also was found to have elevated IgA levels (2x the upper limit of normal) and had a BM Bx and PET-CT.  Weight loss had been central complaint, with weight mostly in 140s over last year. He underwent extensive GI w/u for this recently, has not found a clear cause. Also seen by rheum.   Received two addl doses parenteral Fe here 11/2024- Fe studies not c/w def but iron stores decreased in 10/2024 marrow; RDW increased and now wnl post iv Fe  CBC results today: WBC 8.59, Hgb 12.3, Plt 276K. MCV 93.4, nl diff   Hgb still lower vs . 6/2022 when it was 13.5 Fe loss  ? GI despite neg w/u, ? AVMs?   immunoelectrophoresis cont to show weak IgA M-spike which can't be quantitated cold agglutinins as poss cause of intravascular Fe loss checked and neg total IgA increased vs 3/2023 FLC kappa and lambda and ratio relatively unchanged- repeat immunoelectrophoresis today  check CMP, LDH, immunoelectrophoresis, ferritin RV 6 mos or prn

## 2025-03-05 NOTE — PHYSICAL EXAM
[de-identified] : no anterior/posterior cervical, submental/submandibular, axillary, or pelvic MAURO appreciated [Fully active, able to carry on all pre-disease performance without restriction] : Status 0 - Fully active, able to carry on all pre-disease performance without restriction [Normal] : full range of motion and no deformities appreciated [de-identified] : legs wwp, no edema [de-identified] : abdomen soft NTND, (+) BS, no splenomegaly

## 2025-03-05 NOTE — HISTORY OF PRESENT ILLNESS
[Disease:__________________________] : Disease: [unfilled] [de-identified] : Mr Green is a 63 year old male presenting for workup of chronic anemia (>15 yrs) and recently discovered elevated IgA with IgA kappa band. He has a PMHx of cutaneous psoriasis (uses lotion),  HTN, CAD (s/p 2 stents, now on ASA alone) and iron deficiency anemia. In terms of the iron deficiency anemia he has been on oral iron and recently received IV iron (3x in February) through [facility]. In terms of potential sources of iron deficiency anemia, he underwent EGD and colonoscopy in 2016, again in 2019 with capsule endoscopy added on, and again in a colonoscopy and endoscopy in Feb 2020 (capsule endoscopy is pending) which all showed chronic mild gastritis and mild diverticulosis without any source of bleeding. He has never seen blood in his stool, or dark tarry stools (has darker stools with iron tablets). He also denies hematuria or urinary symptoms. His primary complaint is that he has been very weak to the point he does not like to leave the house. He denies fevers, chills, night sweats, but does have weight loss. He has lost nearly 40 lbs since September 2019 (previously was 190 lbs). He denies any appetite changes or dietary changes. He last traveled out of the country in October 2019 to [place] (for 2.5 months) and when there he felt like he was much more energetic. He has not received any DMARDs for his psoriasis.  These issues had prompted additional workup in January 2020 (Dr Jung Devine) then had to switch to Dr Ki Han in February due to insurance coverage. He had a CT scan of his chest abdomen and pelvis ordered by his GI doc (Dr Linnea Keenan) which showed duodenal and jejunal diverticulosis and several prominent right lower quadrant mesenteric lymph nodes which were also previously seen in 2015. He was found to have iron deficiency anemia (given IV iron) and also found to have an IgA kappa band / IgA MGUS and RLQ lymphadenopathy which prompted a PET-CT and bone marrow biopsy.    On 3/5/20 he had a PET-CT: impression showed subcentimeter right lower quadrant lymph nodes that are not discretely FDG- avid and too small to characterize, however several appear slightly smaller or less prominent compared to outside CT performed 2/7/20.  He also had a left hip Bone Marrow Biopsy on 3/16/20: - Variable and hypercellular for age of marrow shows maturing trilineage hematopoiesis and no histological or immunophenotypic (via IHC) evidence of clonal cell neoplasm - No evidence of acute myeloid leukemia, metastatic neoplasm or lymphoma.   Lastly, he had an MRI of the brain 2/7/20 which showed mild midline prominence of the pituitary which was performed according to Mr Green due to fatigue and weight loss. [de-identified] : sl decrease in GI c/o; dahlia constipation and bloating   CRP 10, ESR 82 4 wks ago w/o definite rheumatic c/o 11/2024, more recent ESR 52 no fevers saw Dr. Walden, being watched and not given steroids at this timer last BM showed decr Fe stores Had been given parenteral iron in past. Source of Fe def not clear. Extensive GI w/u neg, including panendoscopy incl capsule 12/2022.  Weak IgA M-spike being followed, too small to quantitate.

## 2025-03-05 NOTE — REVIEW OF SYSTEMS
[Chills] : chills [Fatigue] : fatigue [Recent Change In Weight] : ~T recent weight change [Cough] : cough [Constipation] : constipation [Diarrhea: Grade 0] : Diarrhea: Grade 0 [Joint Pain] : joint pain [FreeTextEntry2] : occasional night sweats recently once every 2 weeks, varies; chills chronic x2 yrs; (+) fatigue since April [Fever] : no fever [Dysphagia] : no dysphagia [Odynophagia] : no odynophagia [Chest Pain] : no chest pain [Palpitations] : no palpitations [Shortness Of Breath] : no shortness of breath [Wheezing] : no wheezing [Abdominal Pain] : no abdominal pain [Dysuria] : no dysuria [Skin Rash] : no skin rash [Dizziness] : no dizziness [Easy Bleeding] : no tendency for easy bleeding [Easy Bruising] : no tendency for easy bruising [de-identified] : no headaches

## 2025-03-05 NOTE — HISTORY OF PRESENT ILLNESS
[Disease:__________________________] : Disease: [unfilled] [de-identified] : Mr Green is a 63 year old male presenting for workup of chronic anemia (>15 yrs) and recently discovered elevated IgA with IgA kappa band. He has a PMHx of cutaneous psoriasis (uses lotion),  HTN, CAD (s/p 2 stents, now on ASA alone) and iron deficiency anemia. In terms of the iron deficiency anemia he has been on oral iron and recently received IV iron (3x in February) through [facility]. In terms of potential sources of iron deficiency anemia, he underwent EGD and colonoscopy in 2016, again in 2019 with capsule endoscopy added on, and again in a colonoscopy and endoscopy in Feb 2020 (capsule endoscopy is pending) which all showed chronic mild gastritis and mild diverticulosis without any source of bleeding. He has never seen blood in his stool, or dark tarry stools (has darker stools with iron tablets). He also denies hematuria or urinary symptoms. His primary complaint is that he has been very weak to the point he does not like to leave the house. He denies fevers, chills, night sweats, but does have weight loss. He has lost nearly 40 lbs since September 2019 (previously was 190 lbs). He denies any appetite changes or dietary changes. He last traveled out of the country in October 2019 to [place] (for 2.5 months) and when there he felt like he was much more energetic. He has not received any DMARDs for his psoriasis.  These issues had prompted additional workup in January 2020 (Dr Jung Devine) then had to switch to Dr Ki Han in February due to insurance coverage. He had a CT scan of his chest abdomen and pelvis ordered by his GI doc (Dr Linnea Keenan) which showed duodenal and jejunal diverticulosis and several prominent right lower quadrant mesenteric lymph nodes which were also previously seen in 2015. He was found to have iron deficiency anemia (given IV iron) and also found to have an IgA kappa band / IgA MGUS and RLQ lymphadenopathy which prompted a PET-CT and bone marrow biopsy.    On 3/5/20 he had a PET-CT: impression showed subcentimeter right lower quadrant lymph nodes that are not discretely FDG- avid and too small to characterize, however several appear slightly smaller or less prominent compared to outside CT performed 2/7/20.  He also had a left hip Bone Marrow Biopsy on 3/16/20: - Variable and hypercellular for age of marrow shows maturing trilineage hematopoiesis and no histological or immunophenotypic (via IHC) evidence of clonal cell neoplasm - No evidence of acute myeloid leukemia, metastatic neoplasm or lymphoma.   Lastly, he had an MRI of the brain 2/7/20 which showed mild midline prominence of the pituitary which was performed according to Mr Green due to fatigue and weight loss. [de-identified] : sl decrease in GI c/o; dahlia constipation and bloating   CRP 10, ESR 82 4 wks ago w/o definite rheumatic c/o 11/2024, more recent ESR 52 no fevers saw Dr. Walden, being watched and not given steroids at this timer last BM showed decr Fe stores Had been given parenteral iron in past. Source of Fe def not clear. Extensive GI w/u neg, including panendoscopy incl capsule 12/2022.  Weak IgA M-spike being followed, too small to quantitate.

## 2025-03-05 NOTE — HISTORY OF PRESENT ILLNESS
[Disease:__________________________] : Disease: [unfilled] [de-identified] : Mr Green is a 63 year old male presenting for workup of chronic anemia (>15 yrs) and recently discovered elevated IgA with IgA kappa band. He has a PMHx of cutaneous psoriasis (uses lotion),  HTN, CAD (s/p 2 stents, now on ASA alone) and iron deficiency anemia. In terms of the iron deficiency anemia he has been on oral iron and recently received IV iron (3x in February) through [facility]. In terms of potential sources of iron deficiency anemia, he underwent EGD and colonoscopy in 2016, again in 2019 with capsule endoscopy added on, and again in a colonoscopy and endoscopy in Feb 2020 (capsule endoscopy is pending) which all showed chronic mild gastritis and mild diverticulosis without any source of bleeding. He has never seen blood in his stool, or dark tarry stools (has darker stools with iron tablets). He also denies hematuria or urinary symptoms. His primary complaint is that he has been very weak to the point he does not like to leave the house. He denies fevers, chills, night sweats, but does have weight loss. He has lost nearly 40 lbs since September 2019 (previously was 190 lbs). He denies any appetite changes or dietary changes. He last traveled out of the country in October 2019 to [place] (for 2.5 months) and when there he felt like he was much more energetic. He has not received any DMARDs for his psoriasis.  These issues had prompted additional workup in January 2020 (Dr Jung Devine) then had to switch to Dr Ki Han in February due to insurance coverage. He had a CT scan of his chest abdomen and pelvis ordered by his GI doc (Dr Linnea Keenan) which showed duodenal and jejunal diverticulosis and several prominent right lower quadrant mesenteric lymph nodes which were also previously seen in 2015. He was found to have iron deficiency anemia (given IV iron) and also found to have an IgA kappa band / IgA MGUS and RLQ lymphadenopathy which prompted a PET-CT and bone marrow biopsy.    On 3/5/20 he had a PET-CT: impression showed subcentimeter right lower quadrant lymph nodes that are not discretely FDG- avid and too small to characterize, however several appear slightly smaller or less prominent compared to outside CT performed 2/7/20.  He also had a left hip Bone Marrow Biopsy on 3/16/20: - Variable and hypercellular for age of marrow shows maturing trilineage hematopoiesis and no histological or immunophenotypic (via IHC) evidence of clonal cell neoplasm - No evidence of acute myeloid leukemia, metastatic neoplasm or lymphoma.   Lastly, he had an MRI of the brain 2/7/20 which showed mild midline prominence of the pituitary which was performed according to Mr Green due to fatigue and weight loss. [de-identified] : sl decrease in GI c/o; dahlia constipation and bloating   CRP 10, ESR 82 4 wks ago w/o definite rheumatic c/o 11/2024, more recent ESR 52 no fevers saw Dr. Walden, being watched and not given steroids at this timer last BM showed decr Fe stores Had been given parenteral iron in past. Source of Fe def not clear. Extensive GI w/u neg, including panendoscopy incl capsule 12/2022.  Weak IgA M-spike being followed, too small to quantitate.

## 2025-03-05 NOTE — ASSESSMENT
[FreeTextEntry1] : Pt with h/o anemia and IgA MGUS. He underwent a full workup for anemia at [facility] (Dr Han) and was found to have iron deficiency and was given 3 doses of IV iron and he was further evaluated by GI for bleeding and had a neg w/u most recently with Serafin Dallas and Charmaine. He also was found to have elevated IgA levels (2x the upper limit of normal) and had a BM Bx and PET-CT.  Weight loss had been central complaint, with weight mostly in 140s over last year. He underwent extensive GI w/u for this recently, has not found a clear cause. Also seen by rheum.   Received two addl doses parenteral Fe here 11/2024- Fe studies not c/w def but iron stores decreased in 10/2024 marrow; RDW increased and now wnl post iv Fe  CBC results today: WBC 8.59, Hgb 12.3, Plt 276K. MCV 93.4, nl diff   Hgb still lower vs . 6/2022 when it was 13.5 Fe loss  ? GI despite neg w/u, ? AVMs?   immunoelectrophoresis cont to show weak IgA M-spike which can't be quantitated cold agglutinins as poss cause of intravascular Fe loss checked and neg total IgA increased vs 3/2023 FLC kappa and lambda and ratio relatively unchanged- repeat immunoelectrophoresis today  check CMP, LDH, immunoelectrophoresis, ferritin RV 6 mos or prn

## 2025-03-05 NOTE — REVIEW OF SYSTEMS
[Chills] : chills [Fatigue] : fatigue [Recent Change In Weight] : ~T recent weight change [Cough] : cough [Constipation] : constipation [Diarrhea: Grade 0] : Diarrhea: Grade 0 [Joint Pain] : joint pain [FreeTextEntry2] : occasional night sweats recently once every 2 weeks, varies; chills chronic x2 yrs; (+) fatigue since April [Fever] : no fever [Dysphagia] : no dysphagia [Odynophagia] : no odynophagia [Chest Pain] : no chest pain [Palpitations] : no palpitations [Shortness Of Breath] : no shortness of breath [Wheezing] : no wheezing [Abdominal Pain] : no abdominal pain [Dysuria] : no dysuria [Skin Rash] : no skin rash [Dizziness] : no dizziness [Easy Bleeding] : no tendency for easy bleeding [Easy Bruising] : no tendency for easy bruising [de-identified] : no headaches

## 2025-03-05 NOTE — PHYSICAL EXAM
[de-identified] : no anterior/posterior cervical, submental/submandibular, axillary, or pelvic MAURO appreciated [Fully active, able to carry on all pre-disease performance without restriction] : Status 0 - Fully active, able to carry on all pre-disease performance without restriction [Normal] : full range of motion and no deformities appreciated [de-identified] : legs wwp, no edema [de-identified] : abdomen soft NTND, (+) BS, no splenomegaly

## 2025-03-05 NOTE — PHYSICAL EXAM
[de-identified] : no anterior/posterior cervical, submental/submandibular, axillary, or pelvic MAURO appreciated [Fully active, able to carry on all pre-disease performance without restriction] : Status 0 - Fully active, able to carry on all pre-disease performance without restriction [Normal] : full range of motion and no deformities appreciated [de-identified] : legs wwp, no edema [de-identified] : abdomen soft NTND, (+) BS, no splenomegaly

## 2025-03-05 NOTE — REVIEW OF SYSTEMS
[Chills] : chills [Fatigue] : fatigue [Recent Change In Weight] : ~T recent weight change [Cough] : cough [Constipation] : constipation [Diarrhea: Grade 0] : Diarrhea: Grade 0 [Joint Pain] : joint pain [FreeTextEntry2] : occasional night sweats recently once every 2 weeks, varies; chills chronic x2 yrs; (+) fatigue since April [Fever] : no fever [Dysphagia] : no dysphagia [Odynophagia] : no odynophagia [Chest Pain] : no chest pain [Palpitations] : no palpitations [Shortness Of Breath] : no shortness of breath [Wheezing] : no wheezing [Abdominal Pain] : no abdominal pain [Dysuria] : no dysuria [Skin Rash] : no skin rash [Dizziness] : no dizziness [Easy Bleeding] : no tendency for easy bleeding [Easy Bruising] : no tendency for easy bruising [de-identified] : no headaches

## 2025-03-05 NOTE — ASSESSMENT
[FreeTextEntry1] : Pt with h/o anemia and IgA MGUS. He underwent a full workup for anemia at [facility] (Dr Han) and was found to have iron deficiency and was given 3 doses of IV iron and he was further evaluated by GI for bleeding and had a neg w/u most recently with Serafin Dallas and Charmaine. He also was found to have elevated IgA levels (2x the upper limit of normal) and had a BM Bx and PET-CT.  Weight loss had been central complaint, with weight mostly in 140s over last year. He underwent extensive GI w/u for this recently, has not found a clear cause. Also seen by rheum.   Received two addl doses parenteral Fe here 11/2024- Fe studies not c/w def but iron stores decreased in 10/2024 marrow; RDW increased and now wnl post iv Fe  CBC results today: WBC 8.59, Hgb 12.3, Plt 276K. MCV 93.4, nl diff   Hgb still lower vs . 6/2022 when it was 13.5 Fe loss  ? GI despite neg w/u, ? AVMs?   immunoelectrophoresis cont to show weak IgA M-spike which can't be quantitated cold agglutinins as poss cause of intravascular Fe loss checked and neg total IgA increased vs 3/2023 FLC kappa and lambda and ratio relatively unchanged- repeat immunoelectrophoresis today  check CMP, LDH, immunoelectrophoresis, ferritin RV 6 mos or prn

## 2025-03-05 NOTE — PHYSICAL EXAM
[de-identified] : no anterior/posterior cervical, submental/submandibular, axillary, or pelvic MAURO appreciated [Fully active, able to carry on all pre-disease performance without restriction] : Status 0 - Fully active, able to carry on all pre-disease performance without restriction [Normal] : full range of motion and no deformities appreciated [de-identified] : legs wwp, no edema [de-identified] : abdomen soft NTND, (+) BS, no splenomegaly

## 2025-03-27 NOTE — PHYSICAL EXAM
[TextEntry] : PHYSICAL EXAMINATION: Vital signs from today's encounter reviewed.  GENERAL: No acute distress, clinically eukinetic, normal appearance HEAD: Normocephalic, atraumatic EYES: conjunctivae are pink and moist, no icterus, no proptosis  NECK: thyroid is not enlarged/nodular on palpation, non-tender, no adenopathy.   CARDIOVASCULAR: well-perfused extremities, no peripheral edema RESPIRATORY: normal chest expansion with good pulmonary effort, no acute respiratory distress MUSCULOSKELETAL: no swelling, normal range of motion, normal gait SKIN: no pallor, no icterus, no rash  NEUROLOGIC: alert and oriented, no evident focal deficits, no tremors  PSYCHIATRIC: mood and affect are normal

## 2025-03-27 NOTE — HISTORY OF PRESENT ILLNESS
[FreeTextEntry1] : CHIEF COMPLAINT: Thyroid nodule, osteopenia  HISTORY OF PRESENTING ILLNESS: The patient is a 68-year-old male being seen in the office today for evaluation of thyroid nodule.  Also with osteopenia.  THYROID NODULE: He tripped and fell in 11/2023, underwent CT head/C-spine in the ER, which incidentally showed a right-sided thyroid nodule. Thyroid US 11/28/2023: RLP 1.6 cm TR 3 nodule.  No other nodules, no cervical lymphadenopathy. FNA of RLP 1.6 cm nodule 12/29/2023: Benign/[place] 2. Repeat thyroid US 3/25/2025: Interval stability of right sided dominant nodule, RLP 1.4 cm.  He is not on any thyroid medications, normal TSH 1.17 from 11/2024. He has a family history of thyroid cancer in his son. No history of radiation exposure to the head and neck.  Symptoms 3/27/2025: No neck swelling, no dysphagia or dyspnea.  Reports that he feels his voice is very low at times.  No tremors, no lower extremity edema.  Endorses chronic constipation.  Endorses stiffness in his neck, follows with rheumatologist for polymyalgia rheumatica.  OSTEOPENIA: DEXA scan 2/5/2025: T-scores LS -2.4, FN -0.6, TH 0.2 No history of fractures. Has a history of vitamin D deficiency, recently completed a course of vitamin D 50,000 IU weekly for 6 weeks. 25 OH vitamin D level 32 from 3/3/2025. Not on osteoporosis medications. Has a history of polymyalgia rheumatica.  Social history: His son is a hospitalist.  They are moving to [place] this year.

## 2025-03-27 NOTE — ASSESSMENT
[FreeTextEntry1] : 1. Thyroid Nodule  Thyroid US 11/28/2023: RLP 1.6 cm TR 3 nodule. No other nodules, no cervical lymphadenopathy. FNA of RLP 1.6 cm nodule 12/29/2023: Benign/[place] 2. Repeat thyroid US 3/25/2025: Interval stability of right sided dominant nodule, RLP 1.4 cm. Family history of thyroid cancer in son. RECOMMENDATIONS: -Biochemically and clinically euthyroid, normal TSH from 11/2024 -Benign thyroid nodule with interval stability.  Recommend repeat US in 2 years, around 3/2027.  2.  Osteopenia 3. Vitamin D deficiency DEXA scan 2/5/2025: T-scores LS -2.4, FN -0.6, TH 0.2 -Recommend weightbearing exercise, fall precautions -Recommend over-the-counter vitamin D supplements -Recommend continuing calcium rich diet, he drinks milk and has yogurt -Recommend repeat bone density scan in 2 years, around 2/2027  RTC annually.  Chuck Ni MD [facility] at [address], [address] Ph: [phone] Fax: [phone]

## 2025-03-27 NOTE — ASSESSMENT
[FreeTextEntry1] : 1. Thyroid Nodule  Thyroid US 11/28/2023: RLP 1.6 cm TR 3 nodule. No other nodules, no cervical lymphadenopathy. FNA of RLP 1.6 cm nodule 12/29/2023: Benign/[place] 2. Repeat thyroid US 3/25/2025: Interval stability of right sided dominant nodule, RLP 1.4 cm. Family history of thyroid cancer in son. RECOMMENDATIONS: -Biochemically and clinically euthyroid, normal TSH from 11/2024 -Benign thyroid nodule with interval stability.  Recommend repeat US in 2 years, around 3/2027.  2.  Osteopenia 3. Vitamin D deficiency DEXA scan 2/5/2025: T-scores LS -2.4, FN -0.6, TH 0.2 -Recommend weightbearing exercise, fall precautions -Recommend over-the-counter vitamin D supplements -Recommend continuing calcium rich diet, he drinks milk and has yogurt -Recommend repeat bone density scan in 2 years, around 2/2027  RTC annually.  Chuck Ni MD [facility] at [address], [address] Ph: [phone] Fax: [phone]

## 2025-03-27 NOTE — HISTORY OF PRESENT ILLNESS
[FreeTextEntry1] : CHIEF COMPLAINT: Thyroid nodule, osteopenia  HISTORY OF PRESENTING ILLNESS: The patient is a 68-year-old male being seen in the office today for evaluation of thyroid nodule.  Also with osteopenia.  THYROID NODULE: He tripped and fell in 11/2023, underwent CT head/C-spine in the ER, which incidentally showed a right-sided thyroid nodule. Thyroid US 11/28/2023: RLP 1.6 cm TR 3 nodule.  No other nodules, no cervical lymphadenopathy. FNA of RLP 1.6 cm nodule 12/29/2023: Benign/[place] 2. Repeat thyroid US 3/25/2025: Interval stability of right sided dominant nodule, RLP 1.4 cm.  He is not on any thyroid medications, normal TSH 1.17 from 11/2024. He has a family history of thyroid cancer in his son. No history of radiation exposure to the head and neck.  Symptoms 3/27/2025: No neck swelling, no dysphagia or dyspnea.  Reports that he feels his voice is very low at times.  No tremors, no lower extremity edema.  Endorses chronic constipation.  Endorses stiffness in his neck, follows with rheumatologist for polymyalgia rheumatica.  OSTEOPENIA: DEXA scan 2/5/2025: T-scores LS -2.4, FN -0.6, TH 0.2 No history of fractures. Has a history of vitamin D deficiency, recently completed a course of vitamin D 50,000 IU weekly for 6 weeks. 25 OH vitamin D level 32 from 3/3/2025. Not on osteoporosis medications. Has a history of polymyalgia rheumatica.  Social history: His son is a hospitalist.  They are moving to [place] this year.

## 2025-03-27 NOTE — REVIEW OF SYSTEMS
[TextEntry] : REVIEW OF SYSTEMS:  The review of systems is otherwise negative except as noted in HPI.

## 2025-04-19 NOTE — HISTORY OF PRESENT ILLNESS
[Home] : at home, [unfilled] , at the time of the visit. [Other Location: e.g. Home (Enter Location, City,State)___] : at [unfilled] [Telehealth (audio & video)] : This visit was provided via telehealth using real-time 2-way audio visual technology. [Verbal consent obtained from patient] : the patient, [unfilled] [Other:____] : [unfilled] [FreeTextEntry8] : 69 yo M pmh CAD s/p stent, BPH, depression, HTN, IgA and IgG monoclonal gammopathy , MGUS, fatty liver, PMR presents for acute visit.  Reports right inguinal and pelvic pain for 2 weeks.  Feels like burning pain in groin.  Pain is worse with walking. No pain with sitting.  Hx of hernia mesh in right inguinal region.  Denies urinary symptoms, hip pain, trauma or falls.  Son Ayad, who is a physician, reports no findings on physical exam where patient is having pain. Patient has appointment scheduled with ortho for next week Has not been taking any medications for pain  Of note, patient has psoriatic arthritis and may be starting remicade.

## 2025-04-19 NOTE — PLAN
[FreeTextEntry1] : Pelvic pain - Nov 2024 MR Pelvis showing "Edema within the right inguinal region tracking superiorly along the anterior abdominal wall and trace free fluid in the pelvis, likely reflecting postoperative change" - MR abdomen and pelvis ordered - ortho appointment next week

## 2025-04-19 NOTE — HISTORY OF PRESENT ILLNESS
[Home] : at home, [unfilled] , at the time of the visit. [Other Location: e.g. Home (Enter Location, City,State)___] : at [unfilled] [Telehealth (audio & video)] : This visit was provided via telehealth using real-time 2-way audio visual technology. [Verbal consent obtained from patient] : the patient, [unfilled] [Other:____] : [unfilled] [FreeTextEntry8] : 67 yo M pmh CAD s/p stent, BPH, depression, HTN, IgA and IgG monoclonal gammopathy , MGUS, fatty liver, PMR presents for acute visit.  Reports right inguinal and pelvic pain for 2 weeks.  Feels like burning pain in groin.  Pain is worse with walking. No pain with sitting.  Hx of hernia mesh in right inguinal region.  Denies urinary symptoms, hip pain, trauma or falls.  Son Ayad, who is a physician, reports no findings on physical exam where patient is having pain. Patient has appointment scheduled with ortho for next week Has not been taking any medications for pain  Of note, patient has psoriatic arthritis and may be starting remicade.

## 2025-04-24 NOTE — REASON FOR VISIT
[Subsequent Evaluation] : a subsequent evaluation for [Hearing Loss] : hearing loss [Gastroesophageal Reflux] : gastroesophageal reflux [FreeTextEntry2] : Globus and throat clearing

## 2025-04-24 NOTE — DATA REVIEWED
[de-identified] : Hearing Test performed to evaluate the extent of hearing loss and  to explain pt's symptoms  was personally reviewed and revealed Tymps-wnl Hearing-bilat SNHL

## 2025-04-24 NOTE — PROCEDURE
[de-identified] : Procedure performed: laryngeal Endoscopy- Diagnostic Indication: Globus, throat discomfort. Verbal and/or written consent obtained from patient, parent, and/or guardian Scope #: [id number], flexible fiber optic telescope used. Scope was introduced through the nose passed on the floor of the nose to the nasopharynx and then followed down the soft palate to the lower pharynx. The tongue Base, Larynx, Hypopharynx were examined. Base of tongue was symmetric, vallecular was clear, epiglottis was not deformed, subglottis/ pyriform and posterior pharyngeal walls were clear. No erythema, edema, pooling of secretions, masses or lesions. Airway patent, no foreign body visualized. No glottic/supraglottic edema. True vocal cords, arytenoids, vestibular folds, ventricles, pyriform sinuses, and aryepiglottic folds appear edematous and erythematous bilaterally. Vocal cords mobile with good contact b/l.

## 2025-04-24 NOTE — ASSESSMENT
[FreeTextEntry1] : LPRD: - Continue with reflux precautions - Continue PPI a/p GI - F/U with GI for continued w/u and Tx.   f/u 1 month for poss nasoendoscopy to explain phlegm  bilateral sensorineural hearing loss-cleared for hearing aids  itchy ears- Rx-Otic Oil

## 2025-04-24 NOTE — HISTORY OF PRESENT ILLNESS
[de-identified] : 69 y/o M with over a year of globus, throat clearing, raspy voice.  He also has GI discomfort.  Had recent EGD and is in the middle of getting a full GI workup.  They have noted duodenal diverticulum, small hiatal hernia.  No trouble eating / swallowing.  He is currently on Pantoprazole.  Nonsmoker.  Also notes decreased hearing, no pain, no vertigo.  Feels the hearing decreased over time.   [FreeTextEntry1] : 4/24/2025 c/o hearing loss and wants a new H Aid also c/o itchy ears-chr and-chr phlegm and is already under tx for GERD no other modifying factors no other nasal or throat complaints

## 2025-04-29 NOTE — HISTORY OF PRESENT ILLNESS
[de-identified] : This is a  68 year old  male experiencing  pain in the right hip and groin which is severe in intensity and has been going on for at least 3 months now.  He did have inguinal hernia repair on the right in October 2024.  He has been evaluated by his doctors with pelvic ultrasound and abdominal MRI and the patient stated that he was told his groin pain is not coming the abdomen and/or hernia repair.  The pain limits activities of daily living. Walking tolerance is  reduced. The patient denies any radiation of the pain to the feet and it is not associated with numbness, tingling, or weakness.

## 2025-04-29 NOTE — DISCUSSION/SUMMARY
[de-identified] : This patient has extra-articular pelvis pain.  There is no evidence of osteoarthritis in the hips and his hip examination is normal.  His groin pain is not secondary to his hip joints.  He does have known spine disease.  He also has polymyalgia rheumatica.  He is to be worked up by his rheumatologist further as he is already undergoing workup and being considered for autoimmune medication.  The patient is not an appropriate candidate for surgical intervention at this time. An extensive discussion was conducted on the natural history of the disease and the variety of surgical and non-surgical options available to the patient including, but not limited to non-steroidal anti-inflammatory medications, steroid injections, physical therapy, maintenance of ideal body weight, and reduction of activity.  I recommend meloxicam for this diagnosis but he prefers take over-the-counter NSAIDs as needed for pain. The patient will schedule an appointment as needed.

## 2025-04-30 NOTE — HISTORY OF PRESENT ILLNESS
[FreeTextEntry1] : follow up [de-identified] : 67 yo M pmh CAD s/p stent, BPH, depression, HTN, IgA and IgG monoclonal gammopathy , MGUS, fatty liver, PMR presents for follow up Patient had abdominal MR and pelvic sono performed due to right sided groin pain. Was unable to do pelvic MR at the time due to insurance issues. He will be seeing orthopedics tomorrow.  Following with rheum Dr Alena Lomeli. Will be starting Taltz for psoriatic arthritis. patient reports worsening back pain.  Hemeonc Dr Marco Antonio Lilly for multiple myeloma

## 2025-04-30 NOTE — HISTORY OF PRESENT ILLNESS
[FreeTextEntry1] : follow up [de-identified] : 69 yo M pmh CAD s/p stent, BPH, depression, HTN, IgA and IgG monoclonal gammopathy , MGUS, fatty liver, PMR presents for follow up Patient had abdominal MR and pelvic sono performed due to right sided groin pain. Was unable to do pelvic MR at the time due to insurance issues. He will be seeing orthopedics tomorrow.  Following with rheum Dr Alena Lomeli. Will be starting Taltz for psoriatic arthritis. patient reports worsening back pain.  Hemeonc Dr Marco Antonio Lilly for multiple myeloma

## 2025-04-30 NOTE — PLAN
[FreeTextEntry1] : Groin pain - MR pelvis ordered - MR abdomen negative.  Stable findings of liver cyst, renal cysts and 8 mm fat containing periumbilical hernia - ortho consult tomorrow.   CAD s/p 2 stents - repeat lipid panel - will go [facility] - continue aspirin 81 mg QD, rosuvastatin 5 mg QD - cardio Dr Johnson  Iron deficiency anemia - recently had bloodwork performed April 2025, hemoglobin 12.2, WBC 10.4 --> will have records sent. patient showed results on phone salome - following with Dr Dawosn.  HTN - Blood pressure at goal - continue metoprolol succ ER 25 mg QD - advised to log BP at home. - advised on dietary changes, increasing exercise, avoiding excess salt  Depression, anxiety - symptoms stable - was prescribed escitalopram 10 mg QD , however has not taken. Patient will consider start.

## 2025-04-30 NOTE — REVIEW OF SYSTEMS
[Negative] : Neurological [Back Pain] : back pain [Muscle Weakness] : no muscle weakness [FreeTextEntry9] : right sided groin pain, chronic back pain

## 2025-04-30 NOTE — HISTORY OF PRESENT ILLNESS
[FreeTextEntry1] : follow up [de-identified] : 67 yo M pmh CAD s/p stent, BPH, depression, HTN, IgA and IgG monoclonal gammopathy , MGUS, fatty liver, PMR presents for follow up Patient had abdominal MR and pelvic sono performed due to right sided groin pain. Was unable to do pelvic MR at the time due to insurance issues. He will be seeing orthopedics tomorrow.  Following with rheum Dr Alena Lomeli. Will be starting Taltz for psoriatic arthritis. patient reports worsening back pain.  Hemeonc Dr Marco Antonio Lilly for multiple myeloma

## 2025-04-30 NOTE — PLAN
[FreeTextEntry1] : Groin pain - MR pelvis ordered - MR abdomen negative.  Stable findings of liver cyst, renal cysts and 8 mm fat containing periumbilical hernia - ortho consult tomorrow.   CAD s/p 2 stents - repeat lipid panel - will go [facility] - continue aspirin 81 mg QD, rosuvastatin 5 mg QD - cardio Dr Johnson  Iron deficiency anemia - recently had bloodwork performed April 2025, hemoglobin 12.2, WBC 10.4 --> will have records sent. patient showed results on phone salome - following with Dr Dawson.  HTN - Blood pressure at goal - continue metoprolol succ ER 25 mg QD - advised to log BP at home. - advised on dietary changes, increasing exercise, avoiding excess salt  Depression, anxiety - symptoms stable - was prescribed escitalopram 10 mg QD , however has not taken. Patient will consider start.

## 2025-05-14 NOTE — HISTORY OF PRESENT ILLNESS
[___ Month(s) Ago] : [unfilled] month(s) ago [Currently Experiencing] : currently [Fatigue] : fatigue [Arthralgias] : arthralgias [Myalgias] : myalgias [FreeTextEntry1] : Has had several doctor's appointment - saw a rheumatologist at [facility] - Dr. Benítez who convinced him to do the prednisone trial. He took the meds for 10 days but felt no improvement in the symptoms. The decision was then made to treat his symptoms as psoriatic arthritis and Taltz was prescribed and approved. He has not yet started the medication. In the interim, he has developed bilateral shoulder pain L>R. Saw the orthopedist who saw possible cystic change in the R shoulder x-ray, no abnormalities on the L side. R shoulder MRI has been ordered. Also saw a hip orthopedist for R side groin pain. Hip x-rays were normal. Has an appointment to follow up with the hernia specialist to see if there is any issues in the site of prior R groin surgery for his inguinal hernia.  [Anorexia] : no anorexia [Weight Loss] : no weight loss [Malaise] : no malaise [Fever] : no fever [Chills] : no chills [Depression] : no depression [Malar Facial Rash] : no malar facial rash [Skin Lesions] : no lesions [Skin Nodules] : no skin nodules [Oral Ulcers] : no oral ulcers [Cough] : no cough [Dry Mouth] : no dry mouth [Dysphonia] : no dysphonia [Dysphagia] : no dysphagia [Shortness of Breath] : no shortness of breath [Chest Pain] : no chest pain [Joint Swelling] : no joint swelling [Joint Warmth] : no joint warmth [Joint Deformity] : no joint deformity [Decreased ROM] : no decreased range of motion [Morning Stiffness] : no morning stiffness [Falls] : no falls [Difficulty Standing] : no difficulty standing [Difficulty Walking] : no difficulty walking [Dyspnea] : no dyspnea [Muscle Weakness] : no muscle weakness [Muscle Spasms] : no muscle spasms [Muscle Cramping] : no muscle cramping [Visual Changes] : no visual changes [Eye Pain] : no eye pain [Eye Redness] : no eye redness [Dry Eyes] : no dry eyes

## 2025-05-14 NOTE — ASSESSMENT
[FreeTextEntry1] : 68 year old male here for evaluation of elevated inflammatory markers.  1. Elevated ESR, CRP and calprotectin with a gradual 60lbs weight loss with concern for PMR given intermittent bilateral shoulder pain, low back and hip pain. Tried Prednisone for 10 days without any benefit. He has a long standing h/o mild psoriasis and some chronic back pain with MRI cervical and thoracic spine showing DJD w/o evidence of axial SpA. Recent exacerbation of symptoms with bilateral shoulder pain, awaiting R shoulder MRI - will follow up. No GCA symptoms. No evidence of radiographic SpA although pelvis MRI shows sequelae of prior sacroiliitis without active inflammation. Will repeat inflammatory markers today.   2. Psoriasis: mild, limited to his L foot. Advised topical therapy. Possible enthesitis versus inflammatory arthritis in the shoulder. Will review MRI. Can consider a trial of Talt for his joint symptoms.   3. DEXA with evidence of osteopenia. Using the FRAX 10 year fracture risk calculator the patient's ten-year risk of any fracture is 3.2% and the patient's risk of  hip fracture  is  0.5%. Based on NOF treatment guidelines medical therapy is not recommended at this time.  Follow up in 6 weeks

## 2025-06-25 NOTE — DATA REVIEWED
[FreeTextEntry1] : 	 EXAM: [id number] - MR ABDOMEN  - ORDERED BY: CHANEL MELTON   PROCEDURE DATE:  04/22/2025    INTERPRETATION:  CLINICAL INFORMATION: right pelvic and abdominal pain. s/p inguinal mesh;  COMPARISON: Comparison made with previous examination(s) dated (MG) 05-Feb-2025,(MR) 14-Nov-2024,(CT) 22-Aug-2024,(NM) 05-Aug-2024,(CT) 28-Nov-2023,(CR) 18-Apr-2023,(CT) 30-Mar-2023,(CT) 07-Apr-2022,(US) 12-Aug-2021,(MR) 27-May-2020,(PT) 05-Mar-2020,(MR) 22-Apr-2016,(CT) 15-Mar-2016,(CR) 23-Oct-2013,(CT) 19-Feb-2011,(CR) 18-Feb-2011,(CT) 22-Apr-2009.  CONTRAST/COMPLICATIONS: IV Contrast: NONE Oral Contrast: NONE .  PROCEDURE: MRI of the abdomen was performed.  FINDINGS:  LUNG BASES: No sizable pleural effusion. LIVER: Liver parenchyma demonstrates normal signal characteristics. No suspicious liver lesion. 3 mm cyst inferiorly right hepatic lobe. No additional imaging follow-up typically required. BILIARY TREE: The gallbladder is unremarkable. No biliary dilation. SPLEEN: Normal. PANCREAS: No mass or ductal dilation. Small accessory duct pancreatic head, pancreatic ductal variant anatomy. Note image 28 series 11. No signs of pancreatic inflammation. ADRENALS: Normal. KIDNEYS/URETERS: The kidneys are symmetric in size. No renal mass. Small bilateral renal cysts, largest in the left renal upper pole measuring 9 mm, no additional follow-up typically required. No hydronephrosis. GI TRACT: No bowel dilation or wall thickening. LYMPH NODES: No retroperitoneal lymphadenopathy. MESENTERY/PERITONEUM: No ascites or mass. VASCULATURE: No abdominal aortic aneurysm. ABDOMINAL WALL: Tiny 8 mm fat-containing periumbilical hernia. BONES: Normal bone marrow signal.  IMPRESSION: 1. No signs of active disease on unenhanced MRI abdomen.  2. Nonacute ancillary findings as noted above.   --- End of Report ---       MARCELO FALK MD; Attending Radiologist This document has been electronically signed. Apr 28 2025  5:39AM

## 2025-06-25 NOTE — HISTORY OF PRESENT ILLNESS
[de-identified] : Exceptionally pleasant gentleman presenting to the office with son and family for hernia consultation. Mr. Green reports a distant prior LEFT inguinal hernia repair some 15 to 20 years ago without subsequent issue. He now reports being aware of a right inguinal hernia for the last 6 or 7 years.  However, it has progressed in size and by symptoms over the last 10 days or so following an upper respiratory infection. He is under the care of GI for chronic constipation but has no associated acute GI or  complaints. Mr. Green is eager to confirm his safety prior to an upcoming trip but would like to ultimately move forward with definitive care through elective repair after his upcoming bone marrow biopsy as part of surveillance for his chronic anemia. [de-identified] : Exceptionally pleasant though admittedly anxious patient returning to office following open repair of a right inguinal hernia with mesh in October.  Fortunately, Mr. Green reports feeling well overall and is pleased with the appearance of his incision and the contour of his right groin. Unfortunately, he complains of intermittent low-grade discomfort or "burning" around the pubic tubercle area He continues his usual activities including extensive travel and is free of any associated or ongoing new GI or  complaint. However, he reports poor response to trial of Voltaren and wishes for additional investigation.

## 2025-06-25 NOTE — PLAN
[FreeTextEntry1] : S/P uncomplicated open repair of a right inguinal hernia with mesh. Mr. SIBLEY is clinically well overall by his history and surgically stable by serial examinations. No evidence by history or exam to suggest recurrence or complications. Cleared to continue all activities with his own comfort as the guide. Unclear if pubic tubercle discomfort is related to repair/ chronic muscle strain-sprain or part of his psoriatic arthritis with moderate degenerative changes of the pubic symphysis noted on most recent MRI. In light of continued complaints and poor response to Voltaren, will move forward with CT of pelvis and also observe his response to pending start of use of Taltz.  Mr. SIBLEY has been encouraged to call with any questions/ concerns/ changes.  Plan is now for follow-up after pending CT. Mr. SIBLEY is pleased and agrees.  I remain available to him. Please note that more than 50% of face-to-face time was spent in counseling and coordination of care.

## 2025-06-25 NOTE — PHYSICAL EXAM
[Respiratory Effort] : normal respiratory effort [Normal Rate and Rhythm] : normal rate and rhythm [No HSM] : no hepatosplenomegaly [Tender] : was nontender [Enlarged] : not enlarged [No Rash or Lesion] : No rash or lesion [Alert] : alert [Oriented to Person] : oriented to person [Oriented to Place] : oriented to place [Oriented to Time] : oriented to time [Anxious] : anxious [de-identified] : Appears well, no acute distress, ambulates easily into the office.  [de-identified] : Remains normocephalic and atraumatic, per his baseline.  [de-identified] : Still supple with full range of motion, per his usual. [FreeTextEntry1] : Deferred.  [de-identified] : Deferred.  [de-identified] : Epigastrium remains WNL- all stable. Small incidental fat containing umbilical hernia during Valsalva- all stable. Left groin intact with well healed operative incision- stable. Right groin with well healed operative site: -wound clean, dry and intact- stable  -no expressible drainage or appreciable odor- no change -no overlying/ surrounding inflammatory change- stable -soft and subtle scar- no change -fully resolved edema/ ecchymoses -no SQ collections -no fascial defect/ laxity on Valsalva in upright/ supine positions -adjacent femoral and Spigelian space intact on Valsalva  [de-identified] : Penis free of lesion.  Cords free of edema or hematoma.  Testes mildly atrophic, but free of tenderness- all stable.   [de-identified] : Deferred.  [de-identified] : Grossly symmetric and within normal limits without motor or sensory deficit.  [de-identified] : but quite appropriately so and quite pleasant/ interactive/ appreciative overall.

## 2025-07-02 NOTE — DISCUSSION/SUMMARY
[de-identified] : 67 y/o male with left shoulder pain.  The patient presents for evaluation of degenerative shoulder pain. Acute worsening of symptoms over the last week and patient requesting MRI imaging. Has not participated in PT as previously outlined. Discussed his presentation is consistent with early degenerative change and arthrosis to the shoulder with likely overuse, repeated injury, and/or age-related "wear and tear" to the soft tissue structures of the shoulder (including RTC). MRI imaging may not change consideration of anti-inflammatory control of shoulder pain and rehabilitation.   Despite recommendations, patient is requesting an MRI of the left shoulder. MRI Rx given.  Recommendation: PT, NSAIDS, Ice as discussed.  Follow up after MRI

## 2025-07-02 NOTE — PHYSICAL EXAM
[de-identified] : Left shoulder exam  Inspection: No malalignment, No defects, No atrophy Skin: No masses, No lesions Neck: Negative Spurling's, full ROM, no pain with ROM AROM: FF to 140, abduction to 50, ER to 50, IR to upper lumbar Painful arc ROM: Pain with any overhead ROM Tenderness: +bicipital tenderness, ++tenderness to the greater tuberosity/RTC insertion, +anterior shoulder/lesser tuberosity tenderness Strength: 4/5 ER, 5/5 IR in adduction, poor exam due to pain, +drop arm test, +[place]'s test AC Joint: No ttp/pain with cross arm testing Biceps: Speed Negative, [place] Negative Impingement test: + Elena, + Neer  Stability: Stable Vasc: 2+ radial pulse Neuro: AIN, PIN, Ulnar nerve intact to motor Sensation: Intact to light touch throughout  [de-identified] : Images were reviewed from [facility]   3 views of the left shoulder were obtained, 05/14/2025, that show no acute fracture or dislocation. There is minimal glenohumeral and mild AC joint degenerative change seen. Type II acromion. There is no significant malalignment. No significant other obvious osseous abnormality, otherwise unremarkable.

## 2025-07-02 NOTE — PHYSICAL EXAM
[de-identified] : Left shoulder exam  Inspection: No malalignment, No defects, No atrophy Skin: No masses, No lesions Neck: Negative Spurling's, full ROM, no pain with ROM AROM: FF to 140, abduction to 50, ER to 50, IR to upper lumbar Painful arc ROM: Pain with any overhead ROM Tenderness: +bicipital tenderness, ++tenderness to the greater tuberosity/RTC insertion, +anterior shoulder/lesser tuberosity tenderness Strength: 4/5 ER, 5/5 IR in adduction, poor exam due to pain, +drop arm test, +[place]var[place]'s test AC Joint: No ttp/pain with cross arm testing Biceps: Speed Negative, [place] Negative Impingement test: + Elnea, + Neer  Stability: Stable Vasc: 2+ radial pulse Neuro: AIN, PIN, Ulnar nerve intact to motor Sensation: Intact to light touch throughout  [de-identified] : Images were reviewed from [facility]   3 views of the left shoulder were obtained, 05/14/2025, that show no acute fracture or dislocation. There is minimal glenohumeral and mild AC joint degenerative change seen. Type II acromion. There is no significant malalignment. No significant other obvious osseous abnormality, otherwise unremarkable.

## 2025-07-02 NOTE — DISCUSSION/SUMMARY
[de-identified] : 69 y/o male with left shoulder pain.  The patient presents for evaluation of degenerative shoulder pain. Acute worsening of symptoms over the last week and patient requesting MRI imaging. Has not participated in PT as previously outlined. Discussed his presentation is consistent with early degenerative change and arthrosis to the shoulder with likely overuse, repeated injury, and/or age-related "wear and tear" to the soft tissue structures of the shoulder (including RTC). MRI imaging may not change consideration of anti-inflammatory control of shoulder pain and rehabilitation.   Despite recommendations, patient is requesting an MRI of the left shoulder. MRI Rx given.  Recommendation: PT, NSAIDS, Ice as discussed.  Follow up after MRI

## 2025-07-02 NOTE — ADDENDUM
[FreeTextEntry1] : This note was written by Kate Celestin on 07/01/2025 acting solely as a scribe for Dr. Jay Cooper.   All medical record entries made by the Scribe were at my, Dr. Jay Cooper, direction and personally dictated by me on 07/01/2025. I have personally reviewed the chart and agree that the record accurately reflects my personal performance of the history, physical exam, assessment and plan.

## 2025-07-02 NOTE — HISTORY OF PRESENT ILLNESS
[de-identified] : 68 year old male presents today with bilateral shoulder pain x 5 months. Patient states that his left shoulder pain worsened in the past 10 days. The pain is constant and worse with movement of the arm. Meloxicam and Voltaren with minimal relief. Of note patient was recently seen by Dr. Romano for shoulder pain in May 2025 and an x-ray of the right shoulder showed a bone lesion. He followed up with Dr. Hay for the lesion, and received a left shoulder CSI on 05/29/2025. Pmhx; Psoriatic OA. under Dr. Richey Rheumatologist  The patient's past medical history, past surgical history, medications and allergies were reviewed by me today with the patient and documented accordingly. In addition, the patient's family and social history, which were noncontributory to this visit were reviewed also.

## 2025-07-02 NOTE — HISTORY OF PRESENT ILLNESS
[de-identified] : 68 year old male presents today with bilateral shoulder pain x 5 months. Patient states that his left shoulder pain worsened in the past 10 days. The pain is constant and worse with movement of the arm. Meloxicam and Voltaren with minimal relief. Of note patient was recently seen by Dr. Romano for shoulder pain in May 2025 and an x-ray of the right shoulder showed a bone lesion. He followed up with Dr. Hay for the lesion, and received a left shoulder CSI on 05/29/2025. Pmhx; Psoriatic OA. under Dr. Richey Rheumatologist  The patient's past medical history, past surgical history, medications and allergies were reviewed by me today with the patient and documented accordingly. In addition, the patient's family and social history, which were noncontributory to this visit were reviewed also.

## 2025-07-07 NOTE — ASSESSMENT
[FreeTextEntry1] : 1. Thyroid Nodule  Thyroid US 11/28/2023: RLP 1.6 cm TR 3 nodule. No other nodules, no cervical lymphadenopathy. FNA of RLP 1.6 cm nodule 12/29/2023: Benign/[place] 2. Repeat thyroid US 3/25/2025: Interval stability of right sided dominant nodule, RLP 1.4 cm. Family history of thyroid cancer in son. RECOMMENDATIONS: -Biochemically and clinically euthyroid, normal TSH from 11/2024.  Recheck TSH annually. -Benign thyroid nodule with interval stability.  Recommend repeat US in 2 years, around 3/2027.  2.  Osteopenia 3. Vitamin D deficiency DEXA scan 2/5/2025: T-scores LS -2.4, FN -0.6, TH 0.2 -Recommend weightbearing exercise, fall precautions -Recommend over-the-counter vitamin D supplements, check 25 [place] vitamin D level -Recommend continuing calcium rich diet, he drinks milk and has yogurt -Recommend repeat bone density scan in 2 years, around 2/2027  RTC annually.  Chuck Ni MD [facility] at [address], [address] Ph: [phone] Fax: [phone]

## 2025-07-07 NOTE — HISTORY OF PRESENT ILLNESS
[FreeTextEntry1] : CHIEF COMPLAINT: Thyroid nodule, osteopenia  HISTORY OF PRESENTING ILLNESS: The patient is a 68-year-old male being seen in the office today for evaluation of thyroid nodule.  Also with osteopenia.  THYROID NODULE: He tripped and fell in 11/2023, underwent CT head/C-spine in the ER, which incidentally showed a right-sided thyroid nodule. Thyroid US 11/28/2023: RLP 1.6 cm TR 3 nodule.  No other nodules, no cervical lymphadenopathy. FNA of RLP 1.6 cm nodule 12/29/2023: Benign/[place] 2. Repeat thyroid US 3/25/2025: Interval stability of right sided dominant nodule, RLP 1.4 cm.  He is not on any thyroid medications, normal TSH 1.17 from 11/2024. He has a family history of thyroid cancer in his son. No history of radiation exposure to the head and neck.  Symptoms 7/7/2025: No neck swelling, no dysphagia or dyspnea or change in voice.  No fractures.  He has psoriatic arthritis and will be starting Taltz for that.  Endorses occasional tiredness, joint pains, constipation.  Takes Linzess for constipation 1-2 times a week.  OSTEOPENIA: DEXA scan 2/5/2025: T-scores LS -2.4, FN -0.6, TH 0.2 No history of fractures. Taking over-the-counter calcium and vitamin D supplements. Not on osteoporosis medications. Has a history of polymyalgia rheumatica, psoriatic arthritis.  Social history: His son is a hospitalist.  They are moving to [place] this year.

## 2025-07-07 NOTE — ASSESSMENT
[FreeTextEntry1] : 1. Thyroid Nodule  Thyroid US 11/28/2023: RLP 1.6 cm TR 3 nodule. No other nodules, no cervical lymphadenopathy. FNA of RLP 1.6 cm nodule 12/29/2023: Benign/[place] 2. Repeat thyroid US 3/25/2025: Interval stability of right sided dominant nodule, RLP 1.4 cm. Family history of thyroid cancer in son. RECOMMENDATIONS: -Biochemically and clinically euthyroid, normal TSH from 11/2024.  Recheck TSH annually. -Benign thyroid nodule with interval stability.  Recommend repeat US in 2 years, around 3/2027.  2.  Osteopenia 3. Vitamin D deficiency DEXA scan 2/5/2025: T-scores LS -2.4, FN -0.6, TH 0.2 -Recommend weightbearing exercise, fall precautions -Recommend over-the-counter vitamin D supplements, check 25 OH vitamin D level -Recommend continuing calcium rich diet, he drinks milk and has yogurt -Recommend repeat bone density scan in 2 years, around 2/2027  RTC annually.  Chuck Ni MD [facility] at [address], [address] Ph: [phone] Fax: [phone]

## 2025-07-07 NOTE — HISTORY OF PRESENT ILLNESS
[FreeTextEntry1] : CHIEF COMPLAINT: Thyroid nodule, osteopenia  HISTORY OF PRESENTING ILLNESS: The patient is a 68-year-old male being seen in the office today for evaluation of thyroid nodule.  Also with osteopenia.  THYROID NODULE: He tripped and fell in 11/2023, underwent CT head/C-spine in the ER, which incidentally showed a right-sided thyroid nodule. Thyroid US 11/28/2023: RLP 1.6 cm TR 3 nodule.  No other nodules, no cervical lymphadenopathy. FNA of RLP 1.6 cm nodule 12/29/2023: Benign/Vina 2. Repeat thyroid US 3/25/2025: Interval stability of right sided dominant nodule, RLP 1.4 cm.  He is not on any thyroid medications, normal TSH 1.17 from 11/2024. He has a family history of thyroid cancer in his son. No history of radiation exposure to the head and neck.  Symptoms 7/7/2025: No neck swelling, no dysphagia or dyspnea or change in voice.  No fractures.  He has psoriatic arthritis and will be starting Taltz for that.  Endorses occasional tiredness, joint pains, constipation.  Takes Linzess for constipation 1-2 times a week.  OSTEOPENIA: DEXA scan 2/5/2025: T-scores LS -2.4, FN -0.6, TH 0.2 No history of fractures. Taking over-the-counter calcium and vitamin D supplements. Not on osteoporosis medications. Has a history of polymyalgia rheumatica, psoriatic arthritis.  Social history: His son is a hospitalist.  They are moving to [place] this year.